# Patient Record
Sex: FEMALE | URBAN - NONMETROPOLITAN AREA
[De-identification: names, ages, dates, MRNs, and addresses within clinical notes are randomized per-mention and may not be internally consistent; named-entity substitution may affect disease eponyms.]

---

## 2018-05-30 ENCOUNTER — APPOINTMENT (OUTPATIENT)
Age: 68
Setting detail: DERMATOLOGY
End: 2018-05-30

## 2018-05-30 DIAGNOSIS — L63.8 OTHER ALOPECIA AREATA: ICD-10-CM

## 2018-05-30 DIAGNOSIS — L29.8 OTHER PRURITUS: ICD-10-CM

## 2018-05-30 DIAGNOSIS — D485 NEOPLASM OF UNCERTAIN BEHAVIOR OF SKIN: ICD-10-CM

## 2018-05-30 PROBLEM — D48.5 NEOPLASM OF UNCERTAIN BEHAVIOR OF SKIN: Status: ACTIVE | Noted: 2018-05-30

## 2018-05-30 PROCEDURE — OTHER COUNSELING: OTHER

## 2018-05-30 PROCEDURE — OTHER INTRALESIONAL KENALOG: OTHER

## 2018-05-30 PROCEDURE — 99202 OFFICE O/P NEW SF 15 MIN: CPT | Mod: 25

## 2018-05-30 PROCEDURE — 11101: CPT

## 2018-05-30 PROCEDURE — 11100: CPT

## 2018-05-30 PROCEDURE — OTHER OTHER: OTHER

## 2018-05-30 PROCEDURE — 11900 INJECT SKIN LESIONS </W 7: CPT

## 2018-05-30 PROCEDURE — OTHER FOLLOW UP FOR NEXT VISIT: OTHER

## 2018-05-30 PROCEDURE — OTHER BIOPSY BY SHAVE METHOD: OTHER

## 2018-05-30 ASSESSMENT — LOCATION ZONE DERM
LOCATION ZONE: SCALP
LOCATION ZONE: FACE

## 2018-05-30 ASSESSMENT — LOCATION SIMPLE DESCRIPTION DERM
LOCATION SIMPLE: RIGHT CHEEK
LOCATION SIMPLE: LEFT CHEEK
LOCATION SIMPLE: POSTERIOR SCALP

## 2018-05-30 ASSESSMENT — LOCATION DETAILED DESCRIPTION DERM
LOCATION DETAILED: LEFT SUPERIOR CENTRAL MALAR CHEEK
LOCATION DETAILED: RIGHT CENTRAL MALAR CHEEK
LOCATION DETAILED: LEFT SUPERIOR OCCIPITAL SCALP

## 2018-05-30 NOTE — PROCEDURE: OTHER
Note Text (......Xxx Chief Complaint.): This diagnosis correlates with the
Detail Level: Zone
Other (Free Text): Size of initial lesion 4 x 6

## 2018-05-30 NOTE — PROCEDURE: INTRALESIONAL KENALOG
Include Z78.9 (Other Specified Conditions Influencing Health Status) As An Associated Diagnosis?: No
Medical Necessity Clause: This procedure was medically necessary because the lesions that were treated were:
Kenalog Preparation: Kenalog
Treatment Number (Optional): 1
Concentration Of Kenalog Solution Injected (Mg/Ml): 2.5
Consent: The risks of atrophy were reviewed with the patient.
Detail Level: Detailed
Total Volume (Ccs): .5
X Size Of Lesion In Cm (Optional): 0

## 2018-05-30 NOTE — PROCEDURE: BIOPSY BY SHAVE METHOD
Wound Care: Bacitracin
Cryotherapy Text: The wound bed was treated with cryotherapy after the biopsy was performed.
Billing Type: Third-Party Bill
Bill For Surgical Tray: no
X Size Of Lesion In Cm: 0
Anesthesia Volume In Cc: 0.5
Was A Bandage Applied: Yes
Curettage Text: The wound bed was treated with curettage after the biopsy was performed.
Notification Instructions: Patient will be notified of biopsy results. However, patient instructed to call the office if not contacted within 2 weeks.
Post-Care Instructions: I reviewed with the patient in detail post-care instructions. Patient is to keep the biopsy site dry overnight, and then apply bacitracin twice daily until healed. Patient may apply hydrogen peroxide soaks to remove any crusting.
Electrodesiccation Text: The wound bed was treated with electrodesiccation after the biopsy was performed.
Dressing: bandage
Detail Level: Detailed
Hemostasis: Drysol
Electrodesiccation And Curettage Text: The wound bed was treated with electrodesiccation and curettage after the biopsy was performed.
Silver Nitrate Text: The wound bed was treated with silver nitrate after the biopsy was performed.
Type Of Destruction Used: Curettage
Consent: Written consent was obtained and risks were reviewed including but not limited to scarring, infection, bleeding, scabbing, incomplete removal, nerve damage and allergy to anesthesia.
Biopsy Type: H and E
Size Of Lesion In Cm: 0.3

## 2018-06-20 ENCOUNTER — APPOINTMENT (OUTPATIENT)
Age: 68
Setting detail: DERMATOLOGY
End: 2018-06-21

## 2018-06-20 DIAGNOSIS — L29.8 OTHER PRURITUS: ICD-10-CM

## 2018-06-20 DIAGNOSIS — L63.8 OTHER ALOPECIA AREATA: ICD-10-CM

## 2018-06-20 PROBLEM — C44.329 SQUAMOUS CELL CARCINOMA OF SKIN OF OTHER PARTS OF FACE: Status: ACTIVE | Noted: 2018-06-20

## 2018-06-20 PROBLEM — D04.39 CARCINOMA IN SITU OF SKIN OF OTHER PARTS OF FACE: Status: ACTIVE | Noted: 2018-06-20

## 2018-06-20 PROCEDURE — 77285 THER RAD SIMULAJ FIELD INTRM: CPT

## 2018-06-20 PROCEDURE — OTHER SUPERFICIAL RADIATION TREATMENT: OTHER

## 2018-06-20 PROCEDURE — 77334 RADIATION TREATMENT AID(S): CPT

## 2018-06-20 PROCEDURE — OTHER FOLLOW UP FOR NEXT VISIT: OTHER

## 2018-06-20 PROCEDURE — 11901 INJECT SKIN LESIONS >7: CPT

## 2018-06-20 PROCEDURE — 77300 RADIATION THERAPY DOSE PLAN: CPT

## 2018-06-20 PROCEDURE — OTHER INTRALESIONAL KENALOG: OTHER

## 2018-06-20 PROCEDURE — 77262 THER RADIOLOGY TX PLNG INTRM: CPT

## 2018-06-20 PROCEDURE — 99212 OFFICE O/P EST SF 10 MIN: CPT | Mod: 25

## 2018-06-20 PROCEDURE — OTHER TREATMENT REGIMEN: OTHER

## 2018-06-20 PROCEDURE — OTHER COUNSELING: OTHER

## 2018-06-20 ASSESSMENT — LOCATION ZONE DERM: LOCATION ZONE: SCALP

## 2018-06-20 ASSESSMENT — LOCATION DETAILED DESCRIPTION DERM
LOCATION DETAILED: RIGHT SUPERIOR PARIETAL SCALP
LOCATION DETAILED: LEFT SUPERIOR PARIETAL SCALP
LOCATION DETAILED: LEFT SUPERIOR OCCIPITAL SCALP
LOCATION DETAILED: POSTERIOR MID-PARIETAL SCALP

## 2018-06-20 ASSESSMENT — LOCATION SIMPLE DESCRIPTION DERM
LOCATION SIMPLE: LEFT OCCIPITAL SCALP
LOCATION SIMPLE: POSTERIOR SCALP
LOCATION SIMPLE: SCALP

## 2018-06-20 NOTE — PROCEDURE: SUPERFICIAL RADIATION TREATMENT
Render Text From Evaluation And Management Tab (Will Not Bill 76147): No
Detail Level: Detailed
Fractions / Week Rx 2: 3
Treatment Device Design After Initial Simulation Justification (Will Render If Bill For Treatment Devices = Yes): The patient is status post radiation simulation and is evaluated as to the use of additional devices for shielding and placement for radiation therapy.
Port Dimensions-X Axis In Cm: 1.5
Bill For Simulation And Treatment Device Design: Yes
Number Of Treatment Days: 1
Patient Positioning: Supine
Intro Statement (Will Not Render If Left Blank): The patient is undergoing superficial radiation therapy for skin cancer and presents for weekly evaluation and management.  Per protocol and as documented on the flow sheet, the patient was questioned as to subjective redness, pruritus, pain, drainage, fatigue, or any other symptoms.  Objectively, the radiation area was evaluated with regards to erythema, atrophy, scale, crusting, erosion, ulceration, edema, purpura, tenderness, warmth, drainage, and any other findings.  The plan was extensively reviewed including the dose, and dosing schedule.  The simulation and clinical setup was also reviewed as was the external and any internal shields and based on this review the appropriateness and sufficiency of treatment was determined.
Total Number Of Fractions Rx 3: 15
Time Dose Fractionation (Optional- Include Units If Applicable): 88
Field Size (Applicator): 2.5 cm
Total Number Of Fractions: 20
Functional Status: 1 (ambulatory, light activity)
Energy (Optional-Please Include Units): 50kV
Fractionation Number (Evaluation): 5
Daily Fractionated Dose (Optional- Include Units) Rx 2: cGy
Dose Per Fractionation In Cgy (Optional): 253.11
Assessment: Appropriate reaction
Custom Shielding Afterword Text Will Not Be Included With Simple Simulations (X X Y Cm............): port to correlate with the lesion size, including treatment margin. The custom lead shield is adequate to accommodate the appropriate applicator and provide adequate shielding around the treatment site. Additional shielding (as noted below) is used to protect sensitive, normal tissues.
Treatment Time In Min (Optional): 0.33
Custom Shielding Preamble Text Will Not Be Included With Simple Simulations (.......... X X Y Cm): A lead shield of 0.762 mm thickness is utilized to form a molded, custom shield with a
Field Size (Applicator): 2.0 cm
Shielding Size (Optional- Include Units) Rx 2: 2.0 x 2.0 cm
Fractionation Number: 0
Shielding Size (Optional- Include Units): 2.0 x 2.0cm
Simple Simulation Preamble Text Will Be Included With Simple Simulations (.......... Indications): Simple simulation was performed today for the following reasons:
Treatment Time / Fractionation (Optional- Include Units) Rx 2: min
Simple Simulation Afterword Text Will Be Included With Simple Simulations (Indications............): The patient had a complete consultation regarding all applicable modalities for the treatment of their skin cancer and based on a variety of factors including the type of tumor, size, and location, the relevant medical history as well as local tissue factors, the functional status of the individual, the ability to perform necessary postoperative wound instructions and the need for simultaneous treatments as well as overall wound healing status, it was determined that the patient would begin radiation therapy treatment for skin cancer.  A full simulation and treatment device design was performed including the determination and formulation of appropriate simple and complex devices including lead shield of 0.762 mm thickness to form molded customized shielding to specifically correlate with the lesion size including treatment margin.  The custom lead shield is adequate to accommodate the appropriate applicator and provide adequate shielding around the treatment site.  The specific field applicator, shields, and devices both simple and complex as well as the specific patient setup is outlined below.  The patient was given a full consent for superficial radiation to both verbally and in writing and the full determination of patient's eligibility for treatment and selection is outlined on the patient eligibility and treatment selection form.  The specific superficial radiotherapy prescription was determined and was documented on the superficial radiotherapy prescription form.  A treatment calculation was also performed and documented on the treatment calculation form.  Based on the prescription, the patient was scheduled for a series of fractional treatments.
Dimensions-X Axis In Cm: 0.7
Port Dimensions-Y Axis In Cm: 2
Computed Treatment Time In Min (Will Render The Same As Calculated Treatment Time If Left Blank): 0.34
Shielding Size (Optional- Include Units): 1.5 x 1.5cm
Total Dose (Optional-Please Include Units): 5127.2cGy
Pathology Override (Pathology Will Render As Diagnosis Name If Left Blank): SCC, superficial
Treatment Margins In Cm: 0.5
Daily Fractionated Dose (Optional- Include Units): 256.36 cGy
Total Dose (Optional-Please Include Units): 5062.2 cGy
Daily Fractionated Dose (Optional- Include Units): 253.11 cGy
Treatment Margins In Cm: 0.8
Time Dose Fractionation (Optional- Include Units If Applicable): 86
Treatment Time / Fractionation (Optional- Include Units): 0.33 min
Treatment Time / Fractionation (Optional- Include Units): 0.34 min
Dose Per Fractionation In Cgy (Optional): 256.36

## 2018-06-20 NOTE — PROCEDURE: TREATMENT REGIMEN
Detail Level: Zone
Plan: Per the request of Dr. Sanchez, patient was seen today for Superficial Radiation Therapy requiring simulation (CPT® 99250) in preparation for treatment of specific diseased site(s). Simulation is necessary to determine correct patient and treatment portal positioning, deliver safe and effective radiation therapy. Today’s setup was evaluated determining continuation of treatment with the current plan, or necessary changes as appropriate. All appropriate custom blocking and treatment parameters verified by the Radiation Therapist\\n\\nToday’s visit is for Simulation and planning for radiation therapy.  Questions were answered and concerns were addressed.  Patient was evaluated based on listed criteria and is a suitable candidate to begin SRT.

## 2018-06-20 NOTE — PROCEDURE: INTRALESIONAL KENALOG
Administered By (Optional): DR. CAR
Include Z78.9 (Other Specified Conditions Influencing Health Status) As An Associated Diagnosis?: No
Medical Necessity Clause: This procedure was medically necessary because the lesions that were treated were:
Total Volume (Ccs): 1
Expiration Date For Kenalog (Optional): 8/8
Concentration Of Kenalog Solution Injected (Mg/Ml): 4.0
Treatment Number (Optional): 2
Ndc# For Kenalog Only: 7971-1636-01
Kenalog Preparation: Kenalog
Detail Level: Detailed
Consent: The risks of atrophy were reviewed with the patient.
Lot # For Kenalog (Optional): [id number]
X Size Of Lesion In Cm (Optional): 0

## 2018-06-25 ENCOUNTER — APPOINTMENT (OUTPATIENT)
Age: 68
Setting detail: DERMATOLOGY
End: 2018-06-26

## 2018-06-25 PROBLEM — D04.39 CARCINOMA IN SITU OF SKIN OF OTHER PARTS OF FACE: Status: ACTIVE | Noted: 2018-06-25

## 2018-06-25 PROBLEM — C44.329 SQUAMOUS CELL CARCINOMA OF SKIN OF OTHER PARTS OF FACE: Status: ACTIVE | Noted: 2018-06-25

## 2018-06-25 PROCEDURE — OTHER TREATMENT REGIMEN: OTHER

## 2018-06-25 PROCEDURE — 77280 THER RAD SIMULAJ FIELD SMPL: CPT

## 2018-06-25 PROCEDURE — OTHER SUPERFICIAL RADIATION TREATMENT: OTHER

## 2018-06-25 PROCEDURE — OTHER FOLLOW UP FOR NEXT VISIT: OTHER

## 2018-06-25 PROCEDURE — G6001 ECHO GUIDANCE RADIOTHERAPY: HCPCS

## 2018-06-25 PROCEDURE — 77401 RADIATION TX DELIVERY SUPFC: CPT

## 2018-06-25 NOTE — PROCEDURE: TREATMENT REGIMEN
Detail Level: Zone
Plan: Per the request of Dr. Sanchez, patient was seen today for Superficial Radiation Therapy requiring simulation (CPT® 07671) in preparation for treatment of specific diseased site(s). Simulation is necessary to determine correct patient and treatment portal positioning, deliver safe and effective radiation therapy. A high frequency ultrasound image was acquired prior to treatment today for three dimensional evaluation of tumor volume and response to treatment, in addition, geometric accuracy of field placement (CPT®  ). Physician evaluation of the ultrasound tumor depth will be ongoing through course of treatment, and is deemed medically necessary ensuring efficacy of treatment. Today’s image and setup was evaluated determining continuation of treatment with the current plan, or necessary changes as appropriate. All appropriate custom blocking and treatment parameters verified by the radiation therapist according to initial simulation. \\n\\nPer Dr. Sanchez, continued daily US guidance and simulation is required for field placement, measurement of tumor depth, progress and edema monitoring.\\n\\nEvaluation prior to treatment for response and reaction to SRT based on current fraction and cumulative dose with a visual inspection and ultrasound demonstrates a normal expected response.  RTOG Acute Radiation Morbidity Score = 0.  Superficial Radiation Therapy will continue as planned.\\n\\nUS image guidance and field placement prior to treatment delivery performed. \\n\\nRt Central Malar Cheek\\nUS Depth 0.91mm, Repop +++, [place] equivocal\\n\\nLt Superior Central Malar Cheek\\nUS depth is 0.96mm, Repop ++, [place] equivocal

## 2018-06-25 NOTE — PROCEDURE: SUPERFICIAL RADIATION TREATMENT
Render Prescriptions In Note?: No
Port Dimensions-Y Axis In Cm: 1.5
Time Dose Fractionation (Optional- Include Units If Applicable): 88
Fractions / Week Rx 4: 5
Ssd In Cm (Optional): 15
Number Of Treatment Days: 1
Treatment Time / Fractionation (Optional- Include Units): 0.34 min
Assessment: Appropriate reaction
Treatment Time / Fractionation (Optional- Include Units): 0.33 min
Field Size (Applicator): 2.0 cm
Custom Shielding Preamble Text Will Not Be Included With Simple Simulations (.......... X X Y Cm): A lead shield of 0.762 mm thickness is utilized to form a molded, custom shield with a
Port Dimensions-Y Axis In Cm: 2
Cumulative Dose In Cgy (Optional): 256.36
Daily Fractionated Dose (Optional- Include Units) Rx 2: cGy
Simple Simulation Afterword Text Will Be Included With Simple Simulations (Indications............): The patient had a complete consultation regarding all applicable modalities for the treatment of their skin cancer and based on a variety of factors including the type of tumor, size, and location, the relevant medical history as well as local tissue factors, the functional status of the individual, the ability to perform necessary postoperative wound instructions and the need for simultaneous treatments as well as overall wound healing status, it was determined that the patient would begin radiation therapy treatment for skin cancer.  A full simulation and treatment device design was performed including the determination and formulation of appropriate simple and complex devices including lead shield of 0.762 mm thickness to form molded customized shielding to specifically correlate with the lesion size including treatment margin.  The custom lead shield is adequate to accommodate the appropriate applicator and provide adequate shielding around the treatment site.  The specific field applicator, shields, and devices both simple and complex as well as the specific patient setup is outlined below.  The patient was given a full consent for superficial radiation to both verbally and in writing and the full determination of patient's eligibility for treatment and selection is outlined on the patient eligibility and treatment selection form.  The specific superficial radiotherapy prescription was determined and was documented on the superficial radiotherapy prescription form.  A treatment calculation was also performed and documented on the treatment calculation form.  Based on the prescription, the patient was scheduled for a series of fractional treatments.
Simple Simulation Preamble Text Will Be Included With Simple Simulations (.......... Indications): Simple simulation was performed today for the following reasons:
Detail Level: Detailed
Fractions / Week: 3
Treatment Margins In Cm: 0.8
Energy (Optional-Please Include Units): 50kV
Treatment Time / Fractionation (Optional- Include Units) Rx 2: min
Cumulative Dose In Cgy (Optional): 253.11
Shielding Size (Optional- Include Units) Rx 2: 2.0 x 2.0 cm
Intro Statement (Will Not Render If Left Blank): The patient is undergoing superficial radiation therapy for skin cancer and presents for weekly evaluation and management.  Per protocol and as documented on the flow sheet, the patient was questioned as to subjective redness, pruritus, pain, drainage, fatigue, or any other symptoms.  Objectively, the radiation area was evaluated with regards to erythema, atrophy, scale, crusting, erosion, ulceration, edema, purpura, tenderness, warmth, drainage, and any other findings.  The plan was extensively reviewed including the dose, and dosing schedule.  The simulation and clinical setup was also reviewed as was the external and any internal shields and based on this review the appropriateness and sufficiency of treatment was determined.
Bill For Radiation Treatment: Yes
Field Size (Applicator): 2.5 cm
Total Dose (Optional-Please Include Units): 5127.2cGy
Functional Status: 1 (ambulatory, light activity)
Daily Fractionated Dose (Optional- Include Units): 253.11 cGy
Dimensions-X Axis In Cm: 0.7
Treatment Device Design After Initial Simulation Justification (Will Render If Bill For Treatment Devices = Yes): The patient is status post radiation simulation and is evaluated as to the use of additional devices for shielding and placement for radiation therapy.
Custom Shielding Afterword Text Will Not Be Included With Simple Simulations (X X Y Cm............): port to correlate with the lesion size, including treatment margin. The custom lead shield is adequate to accommodate the appropriate applicator and provide adequate shielding around the treatment site. Additional shielding (as noted below) is used to protect sensitive, normal tissues.
Day Of The Week Treatment Administered: Monday
Total Dose (Optional-Please Include Units): 5062.2 cGy
Time Dose Fractionation (Optional- Include Units If Applicable): 86
Total Number Of Fractions: 20
Treatment Time In Min (Optional): 0.34
Patient Positioning: Supine
Shielding Size (Optional- Include Units): 2.0 x 2.0cm
Treatment Time In Min (Optional): 0.33
Treatment Margins In Cm: 0.5
Shielding Size (Optional- Include Units): 1.5 x 1.5cm
Daily Fractionated Dose (Optional- Include Units): 256.36 cGy
Pathology Override (Pathology Will Render As Diagnosis Name If Left Blank): SCC, superficial

## 2018-06-27 ENCOUNTER — APPOINTMENT (OUTPATIENT)
Age: 68
Setting detail: DERMATOLOGY
End: 2018-06-28

## 2018-06-27 PROBLEM — D04.39 CARCINOMA IN SITU OF SKIN OF OTHER PARTS OF FACE: Status: ACTIVE | Noted: 2018-06-27

## 2018-06-27 PROBLEM — C44.329 SQUAMOUS CELL CARCINOMA OF SKIN OF OTHER PARTS OF FACE: Status: ACTIVE | Noted: 2018-06-27

## 2018-06-27 PROCEDURE — OTHER SUPERFICIAL RADIATION TREATMENT: OTHER

## 2018-06-27 PROCEDURE — OTHER TREATMENT REGIMEN: OTHER

## 2018-06-27 PROCEDURE — 77280 THER RAD SIMULAJ FIELD SMPL: CPT

## 2018-06-27 PROCEDURE — 77401 RADIATION TX DELIVERY SUPFC: CPT

## 2018-06-27 PROCEDURE — OTHER FOLLOW UP FOR NEXT VISIT: OTHER

## 2018-06-27 PROCEDURE — G6001 ECHO GUIDANCE RADIOTHERAPY: HCPCS

## 2018-06-27 NOTE — PROCEDURE: TREATMENT REGIMEN
Detail Level: Zone
Plan: Per the request of Dr. Sanchez, patient was seen today for Superficial Radiation Therapy requiring simulation (CPT® 89444) in preparation for treatment of specific diseased site(s). Simulation is necessary to determine correct patient and treatment portal positioning, deliver safe and effective radiation therapy. A high frequency ultrasound image was acquired prior to treatment today for three dimensional evaluation of tumor volume and response to treatment, in addition, geometric accuracy of field placement (CPT®  ). Physician evaluation of the ultrasound tumor depth will be ongoing through course of treatment, and is deemed medically necessary ensuring efficacy of treatment. Today’s image and setup was evaluated determining continuation of treatment with the current plan, or necessary changes as appropriate. All appropriate custom blocking and treatment parameters verified by the radiation therapist according to initial simulation. \\n\\nPer Dr. Sanchez, continued daily US guidance and simulation is required for field placement, measurement of tumor depth, progress and edema monitoring.\\n\\nEvaluation prior to treatment for response and reaction to SRT based on current fraction and cumulative dose with a visual inspection and ultrasound demonstrates a normal expected response.  RTOG Acute Radiation Morbidity Score = 0.  Superficial Radiation Therapy will continue as planned.\\n\\nUS image guidance and field placement prior to treatment delivery performed. \\n\\nRt Central Malar Cheek\\nUS Depth 0.83mm, Repop +++, [place] equivocal due to high repopulation\\n\\nLt Superior Central Malar Cheek\\nUS depth is 0.76mm, Repop +++, [place] equivocal due to high repopulation

## 2018-06-27 NOTE — PROCEDURE: SUPERFICIAL RADIATION TREATMENT
Field Size (Applicator) Rx 2: 2.5 cm
Ssd In Cm (Optional): 15
Dimensions-X Axis In Cm: 1
Bill For Dosimetry/Render Treatment Time Calculation In Note: No
Patient Positioning: Supine
Treatment Time / Fractionation (Optional- Include Units): 0.33 min
Simple Simulation Preamble Text Will Be Included With Simple Simulations (.......... Indications): Simple simulation was performed today for the following reasons:
Energy (Optional-Please Include Units) Rx 2: 50Kv
Shielding Size (Optional- Include Units): 1.5 x 1.5cm
Fractions / Week Rx 3: 5
Pathology Override (Pathology Will Render As Diagnosis Name If Left Blank): SCC, superficial
Intro Statement (Will Not Render If Left Blank): The patient is undergoing superficial radiation therapy for skin cancer and presents for weekly evaluation and management.  Per protocol and as documented on the flow sheet, the patient was questioned as to subjective redness, pruritus, pain, drainage, fatigue, or any other symptoms.  Objectively, the radiation area was evaluated with regards to erythema, atrophy, scale, crusting, erosion, ulceration, edema, purpura, tenderness, warmth, drainage, and any other findings.  The plan was extensively reviewed including the dose, and dosing schedule.  The simulation and clinical setup was also reviewed as was the external and any internal shields and based on this review the appropriateness and sufficiency of treatment was determined.
Custom Shielding Afterword Text Will Not Be Included With Simple Simulations (X X Y Cm............): port to correlate with the lesion size, including treatment margin. The custom lead shield is adequate to accommodate the appropriate applicator and provide adequate shielding around the treatment site. Additional shielding (as noted below) is used to protect sensitive, normal tissues.
Dose / Tx In Cgy (Optional): 253.11
Cumulative Dose In Cgy (Optional): 506.22
Functional Status: 1 (ambulatory, light activity)
Day Of The Week Treatment Administered: Wednesday
Time Dose Fractionation (Optional- Include Units If Applicable): 86
Port Dimensions-X Axis In Cm: 1.5
Total Dose (Optional-Please Include Units) Rx 2: cGy
Port Dimensions-Y Axis In Cm: 2
Daily Fractionated Dose (Optional- Include Units): 253.11 cGy
Custom Shielding Preamble Text Will Not Be Included With Simple Simulations (.......... X X Y Cm): A lead shield of 0.762 mm thickness is utilized to form a molded, custom shield with a
Computed Treatment Time In Min (Will Render The Same As Calculated Treatment Time If Left Blank): 0.33
Assessment: Appropriate reaction
Treatment Time / Fractionation (Optional- Include Units) Rx 2: min
Simple Simulation Afterword Text Will Be Included With Simple Simulations (Indications............): The patient had a complete consultation regarding all applicable modalities for the treatment of their skin cancer and based on a variety of factors including the type of tumor, size, and location, the relevant medical history as well as local tissue factors, the functional status of the individual, the ability to perform necessary postoperative wound instructions and the need for simultaneous treatments as well as overall wound healing status, it was determined that the patient would begin radiation therapy treatment for skin cancer.  A full simulation and treatment device design was performed including the determination and formulation of appropriate simple and complex devices including lead shield of 0.762 mm thickness to form molded customized shielding to specifically correlate with the lesion size including treatment margin.  The custom lead shield is adequate to accommodate the appropriate applicator and provide adequate shielding around the treatment site.  The specific field applicator, shields, and devices both simple and complex as well as the specific patient setup is outlined below.  The patient was given a full consent for superficial radiation to both verbally and in writing and the full determination of patient's eligibility for treatment and selection is outlined on the patient eligibility and treatment selection form.  The specific superficial radiotherapy prescription was determined and was documented on the superficial radiotherapy prescription form.  A treatment calculation was also performed and documented on the treatment calculation form.  Based on the prescription, the patient was scheduled for a series of fractional treatments.
Bill For Radiation Treatment: Yes
Fractions / Week Rx 2: 3
Dose / Tx In Cgy (Optional): 256.36
Total Dose (Optional-Please Include Units): 5127.2cGy
Treatment Device Design After Initial Simulation Justification (Will Render If Bill For Treatment Devices = Yes): The patient is status post radiation simulation and is evaluated as to the use of additional devices for shielding and placement for radiation therapy.
Dimensions-Y Axis In Cm: 0.7
Field Size (Applicator): 2.0 cm
Total Number Of Fractions: 20
Treatment Time / Fractionation (Optional- Include Units): 0.34 min
Shielding Size (Optional- Include Units): 2.0 x 2.0cm
Treatment Margins In Cm: 0.5
Treatment Margins In Cm: 0.8
Daily Fractionated Dose (Optional- Include Units): 256.36 cGy
Detail Level: Detailed
Treatment Time In Min (Optional): 0.34
Time Dose Fractionation (Optional- Include Units If Applicable): 88
Cumulative Dose In Cgy (Optional): 512.72
Shielding Size (Optional- Include Units) Rx 2: 2.0 x 2.0 cm
Total Dose (Optional-Please Include Units): 5062.2 cGy

## 2018-06-28 ENCOUNTER — APPOINTMENT (OUTPATIENT)
Age: 68
Setting detail: DERMATOLOGY
End: 2018-06-28

## 2018-06-28 PROBLEM — D04.39 CARCINOMA IN SITU OF SKIN OF OTHER PARTS OF FACE: Status: ACTIVE | Noted: 2018-06-28

## 2018-06-28 PROBLEM — C44.329 SQUAMOUS CELL CARCINOMA OF SKIN OF OTHER PARTS OF FACE: Status: ACTIVE | Noted: 2018-06-28

## 2018-06-28 PROCEDURE — 77401 RADIATION TX DELIVERY SUPFC: CPT

## 2018-06-28 PROCEDURE — OTHER TREATMENT REGIMEN: OTHER

## 2018-06-28 PROCEDURE — 77280 THER RAD SIMULAJ FIELD SMPL: CPT

## 2018-06-28 PROCEDURE — G6001 ECHO GUIDANCE RADIOTHERAPY: HCPCS

## 2018-06-28 PROCEDURE — OTHER FOLLOW UP FOR NEXT VISIT: OTHER

## 2018-06-28 PROCEDURE — OTHER SUPERFICIAL RADIATION TREATMENT: OTHER

## 2018-06-28 NOTE — PROCEDURE: TREATMENT REGIMEN
Plan: Per the request of Dr. Sanchez, patient was seen today for Superficial Radiation Therapy requiring simulation (CPT® 26960) in preparation for treatment of specific diseased site(s). Simulation is necessary to determine correct patient and treatment portal positioning, deliver safe and effective radiation therapy. A high frequency ultrasound image was acquired prior to treatment today for three dimensional evaluation of tumor volume and response to treatment, in addition, geometric accuracy of field placement (CPT®  ). Physician evaluation of the ultrasound tumor depth will be ongoing through course of treatment, and is deemed medically necessary ensuring efficacy of treatment. Today’s image and setup was evaluated determining continuation of treatment with the current plan, or necessary changes as appropriate. All appropriate custom blocking and treatment parameters verified by the radiation therapist according to initial simulation. \\n\\nPer Dr. Sanchez, continued daily US guidance and simulation is required for field placement, measurement of tumor depth, progress and edema monitoring.\\n\\nEvaluation prior to treatment for response and reaction to SRT based on current fraction and cumulative dose with a visual inspection and ultrasound demonstrates a normal expected response.  RTOG Acute Radiation Morbidity Score = 0.  Superficial Radiation Therapy will continue as planned.\\n\\nUS image guidance and field placement prior to treatment delivery performed. \\n\\nRt Central Malar Cheek\\nUS Depth 0.83mm, Repop +++, [place] equivocal due to high repopulation\\n\\nLt Superior Central Malar Cheek\\nUS depth is 1.03mm, Repop +++, [place] equivocal due to high repopulation
Detail Level: Zone

## 2018-07-02 ENCOUNTER — APPOINTMENT (OUTPATIENT)
Age: 68
Setting detail: DERMATOLOGY
End: 2018-07-03

## 2018-07-02 PROBLEM — C44.329 SQUAMOUS CELL CARCINOMA OF SKIN OF OTHER PARTS OF FACE: Status: ACTIVE | Noted: 2018-07-02

## 2018-07-02 PROBLEM — D04.39 CARCINOMA IN SITU OF SKIN OF OTHER PARTS OF FACE: Status: ACTIVE | Noted: 2018-07-02

## 2018-07-02 PROCEDURE — OTHER FOLLOW UP FOR NEXT VISIT: OTHER

## 2018-07-02 PROCEDURE — OTHER SUPERFICIAL RADIATION TREATMENT: OTHER

## 2018-07-02 PROCEDURE — 77280 THER RAD SIMULAJ FIELD SMPL: CPT

## 2018-07-02 PROCEDURE — OTHER TREATMENT REGIMEN: OTHER

## 2018-07-02 PROCEDURE — G6001 ECHO GUIDANCE RADIOTHERAPY: HCPCS

## 2018-07-02 PROCEDURE — 77401 RADIATION TX DELIVERY SUPFC: CPT

## 2018-07-02 NOTE — PROCEDURE: TREATMENT REGIMEN
Plan: Per the request of Dr. Sanchez, patient was seen today for Superficial Radiation Therapy requiring simulation (CPT® 35735) in preparation for treatment of specific diseased site(s). Simulation is necessary to determine correct patient and treatment portal positioning, deliver safe and effective radiation therapy. A high frequency ultrasound image was acquired prior to treatment today for three dimensional evaluation of tumor volume and response to treatment, in addition, geometric accuracy of field placement (CPT®  ). Physician evaluation of the ultrasound tumor depth will be ongoing through course of treatment, and is deemed medically necessary ensuring efficacy of treatment. Today’s image and setup was evaluated determining continuation of treatment with the current plan, or necessary changes as appropriate. All appropriate custom blocking and treatment parameters verified by the radiation therapist according to initial simulation. \\n\\nPer Dr. Sanchez, continued daily US guidance and simulation is required for field placement, measurement of tumor depth, progress and edema monitoring.\\n\\nEvaluation prior to treatment for response and reaction to SRT based on current fraction and cumulative dose with a visual inspection and ultrasound demonstrates a normal expected response.  RTOG Acute Radiation Morbidity Score = 0.  Superficial Radiation Therapy will continue as planned.\\n\\nUS image guidance and field placement prior to treatment delivery performed. \\n\\nRt Central Malar Cheek\\nUS Depth 0.0mm, Repop +++, [place] 0.0mm sq\\n\\nLt Superior Central Malar Cheek\\nUS depth is 0.0mm, Repop +++, [place] 0.0mm sq
Detail Level: Zone

## 2018-07-02 NOTE — PROCEDURE: SUPERFICIAL RADIATION TREATMENT
Pathology Override (Pathology Will Render As Diagnosis Name If Left Blank): SCC, superficial
Port Dimensions-X Axis In Cm: 1.5
Simple Simulation Preamble Text Will Be Included With Simple Simulations (.......... Indications): Simple simulation was performed today for the following reasons:
Energy (Optional-Please Include Units) Rx 2: 50Kv
Detail Level: Detailed
Treatment Device Design After Initial Simulation Justification (Will Render If Bill For Treatment Devices = Yes): The patient is status post radiation simulation and is evaluated as to the use of additional devices for shielding and placement for radiation therapy.
Total Number Of Fractions Rx 2: 15
Simple Simulation Afterword Text Will Be Included With Simple Simulations (Indications............): The patient had a complete consultation regarding all applicable modalities for the treatment of their skin cancer and based on a variety of factors including the type of tumor, size, and location, the relevant medical history as well as local tissue factors, the functional status of the individual, the ability to perform necessary postoperative wound instructions and the need for simultaneous treatments as well as overall wound healing status, it was determined that the patient would begin radiation therapy treatment for skin cancer.  A full simulation and treatment device design was performed including the determination and formulation of appropriate simple and complex devices including lead shield of 0.762 mm thickness to form molded customized shielding to specifically correlate with the lesion size including treatment margin.  The custom lead shield is adequate to accommodate the appropriate applicator and provide adequate shielding around the treatment site.  The specific field applicator, shields, and devices both simple and complex as well as the specific patient setup is outlined below.  The patient was given a full consent for superficial radiation to both verbally and in writing and the full determination of patient's eligibility for treatment and selection is outlined on the patient eligibility and treatment selection form.  The specific superficial radiotherapy prescription was determined and was documented on the superficial radiotherapy prescription form.  A treatment calculation was also performed and documented on the treatment calculation form.  Based on the prescription, the patient was scheduled for a series of fractional treatments.
Custom Shielding Afterword Text Will Not Be Included With Simple Simulations (X X Y Cm............): port to correlate with the lesion size, including treatment margin. The custom lead shield is adequate to accommodate the appropriate applicator and provide adequate shielding around the treatment site. Additional shielding (as noted below) is used to protect sensitive, normal tissues.
Shielding Size (Optional- Include Units): 2.0 x 2.0cm
Treatment Time / Fractionation (Optional- Include Units) Rx 2: min
Field Size (Applicator): 2.0 cm
Bill For Radiation Treatment: Yes
Fractions / Week Rx 4: 5
Render Text From Evaluation And Management Tab (Will Not Bill [id number]): No
Number Of Days Off Treatment: 1
Port Dimensions-Y Axis In Cm: 2
Shielding Size (Optional- Include Units): 1.5 x 1.5cm
Field Size (Applicator) Rx 2: 2.5 cm
Custom Shielding Preamble Text Will Not Be Included With Simple Simulations (.......... X X Y Cm): A lead shield of 0.762 mm thickness is utilized to form a molded, custom shield with a
Assessment: Appropriate reaction
Intro Statement (Will Not Render If Left Blank): The patient is undergoing superficial radiation therapy for skin cancer and presents for weekly evaluation and management.  Per protocol and as documented on the flow sheet, the patient was questioned as to subjective redness, pruritus, pain, drainage, fatigue, or any other symptoms.  Objectively, the radiation area was evaluated with regards to erythema, atrophy, scale, crusting, erosion, ulceration, edema, purpura, tenderness, warmth, drainage, and any other findings.  The plan was extensively reviewed including the dose, and dosing schedule.  The simulation and clinical setup was also reviewed as was the external and any internal shields and based on this review the appropriateness and sufficiency of treatment was determined.
Dimensions-X Axis In Cm: 0.7
Total Dose (Optional-Please Include Units): 5062.2 cGy
Day Of The Week Treatment Administered: Monday
Computed Treatment Time In Min (Will Render The Same As Calculated Treatment Time If Left Blank): 0.33
Treatment Margins In Cm: 0.8
Treatment Time / Fractionation (Optional- Include Units): 0.34 min
Fractionation Number: 4
Treatment Margins In Cm: 0.5
Daily Fractionated Dose (Optional- Include Units): 253.11 cGy
Fractions / Week: 3
Time Dose Fractionation (Optional- Include Units If Applicable): 88
Time Dose Fractionation (Optional- Include Units If Applicable): 86
Dose Per Fractionation In Cgy (Optional): 253.11
Daily Fractionated Dose (Optional- Include Units) Rx 2: cGy
Daily Fractionated Dose (Optional- Include Units): 256.36 cGy
Total Dose (Optional-Please Include Units): 5127.2cGy
Shielding Size (Optional- Include Units) Rx 2: 2.0 x 2.0 cm
Patient Positioning: Supine
Computed Treatment Time In Min (Will Render The Same As Calculated Treatment Time If Left Blank): 0.34
Functional Status: 1 (ambulatory, light activity)
Dose Per Fractionation In Cgy (Optional): 256.36
Total Number Of Fractions: 20
Treatment Time / Fractionation (Optional- Include Units): 0.33 min
Cumulative Dose In Cgy (Optional): 1012.44
Cumulative Dose In Cgy (Optional): 1025.44

## 2018-07-03 ENCOUNTER — APPOINTMENT (OUTPATIENT)
Age: 68
Setting detail: DERMATOLOGY
End: 2018-07-05

## 2018-07-03 PROBLEM — D04.39 CARCINOMA IN SITU OF SKIN OF OTHER PARTS OF FACE: Status: ACTIVE | Noted: 2018-07-03

## 2018-07-03 PROBLEM — C44.329 SQUAMOUS CELL CARCINOMA OF SKIN OF OTHER PARTS OF FACE: Status: ACTIVE | Noted: 2018-07-03

## 2018-07-03 PROCEDURE — OTHER SUPERFICIAL RADIATION TREATMENT: OTHER

## 2018-07-03 PROCEDURE — 77427 RADIATION TX MANAGEMENT X5: CPT | Mod: 25

## 2018-07-03 PROCEDURE — G6001 ECHO GUIDANCE RADIOTHERAPY: HCPCS

## 2018-07-03 PROCEDURE — 77280 THER RAD SIMULAJ FIELD SMPL: CPT

## 2018-07-03 PROCEDURE — OTHER FOLLOW UP FOR NEXT VISIT: OTHER

## 2018-07-03 PROCEDURE — OTHER TREATMENT REGIMEN: OTHER

## 2018-07-03 PROCEDURE — 77401 RADIATION TX DELIVERY SUPFC: CPT

## 2018-07-03 NOTE — PROCEDURE: SUPERFICIAL RADIATION TREATMENT
Include Rx 4 When Rendering Additional Prescriptions: No
Treatment Time / Fractionation (Optional- Include Units) Rx 2: min
Prescription Used: 2
Treatment Time In Min (Optional): 0.34
Fractions / Week Rx 4: 5
Bill For Radiation Treatment: Yes
Simple Simulation Preamble Text Will Be Included With Simple Simulations (.......... Indications): Simple simulation was performed today for the following reasons:
Simple Simulation Afterword Text Will Be Included With Simple Simulations (Indications............): The patient had a complete consultation regarding all applicable modalities for the treatment of their skin cancer and based on a variety of factors including the type of tumor, size, and location, the relevant medical history as well as local tissue factors, the functional status of the individual, the ability to perform necessary postoperative wound instructions and the need for simultaneous treatments as well as overall wound healing status, it was determined that the patient would begin radiation therapy treatment for skin cancer.  A full simulation and treatment device design was performed including the determination and formulation of appropriate simple and complex devices including lead shield of 0.762 mm thickness to form molded customized shielding to specifically correlate with the lesion size including treatment margin.  The custom lead shield is adequate to accommodate the appropriate applicator and provide adequate shielding around the treatment site.  The specific field applicator, shields, and devices both simple and complex as well as the specific patient setup is outlined below.  The patient was given a full consent for superficial radiation to both verbally and in writing and the full determination of patient's eligibility for treatment and selection is outlined on the patient eligibility and treatment selection form.  The specific superficial radiotherapy prescription was determined and was documented on the superficial radiotherapy prescription form.  A treatment calculation was also performed and documented on the treatment calculation form.  Based on the prescription, the patient was scheduled for a series of fractional treatments.
Port Dimensions-Y Axis In Cm: 1.5
Energy (Include Units): 50kV
Custom Shielding Afterword Text Will Not Be Included With Simple Simulations (X X Y Cm............): port to correlate with the lesion size, including treatment margin. The custom lead shield is adequate to accommodate the appropriate applicator and provide adequate shielding around the treatment site. Additional shielding (as noted below) is used to protect sensitive, normal tissues.
Dose / Tx In Cgy (Optional): 253.11
Time Dose Fractionation (Optional- Include Units If Applicable): 88
Dose Per Fractionation In Cgy (Optional): 256.36
Treatment Margins In Cm: 0.8
Fractions / Week: 3
Total Number Of Fractions Rx 3: 15
Field Size (Applicator): 2.0 cm
Intro Statement (Will Not Render If Left Blank): The patient is undergoing superficial radiation therapy for skin cancer and presents for weekly evaluation and management.  Per protocol and as documented on the flow sheet, the patient was questioned as to subjective redness, pruritus, pain, drainage, fatigue, or any other symptoms.  Objectively, the radiation area was evaluated with regards to erythema, atrophy, scale, crusting, erosion, ulceration, edema, purpura, tenderness, warmth, drainage, and any other findings.  The plan was extensively reviewed including the dose, and dosing schedule.  The simulation and clinical setup was also reviewed as was the external and any internal shields and based on this review the appropriateness and sufficiency of treatment was determined.
Dimensions-X Axis In Cm: 1
Cumulative Dose In Cgy (Optional): 1265.55
Day Of The Week Treatment Administered: Tuesday
Comments: On [employer], RTOG 0
Field Size (Applicator): 2.5 cm
Total Number Of Fractions: 20
Functional Status: 1 (ambulatory, light activity)
Treatment Margins In Cm: 0.5
Pathology Override (Pathology Will Render As Diagnosis Name If Left Blank): SCC, superficial
Assessment: Appropriate reaction
Patient Positioning: Supine
Total Dose (Optional-Please Include Units) Rx 2: cGy
Custom Shielding Preamble Text Will Not Be Included With Simple Simulations (.......... X X Y Cm): A lead shield of 0.762 mm thickness is utilized to form a molded, custom shield with a
Daily Fractionated Dose (Optional- Include Units): 256.36 cGy
Treatment Time / Fractionation (Optional- Include Units): 0.33 min
Detail Level: Detailed
Dimensions-X Axis In Cm: 0.7
Computed Treatment Time In Min (Will Render The Same As Calculated Treatment Time If Left Blank): 0.33
Shielding Size (Optional- Include Units): 1.5 x 1.5cm
Time Dose Fractionation (Optional- Include Units If Applicable): 86
Treatment Time / Fractionation (Optional- Include Units): 0.34 min
Shielding Size (Optional- Include Units) Rx 2: 2.0 x 2.0 cm
Treatment Device Design After Initial Simulation Justification (Will Render If Bill For Treatment Devices = Yes): The patient is status post radiation simulation and is evaluated as to the use of additional devices for shielding and placement for radiation therapy.
Total Dose (Optional-Please Include Units): 5127.2cGy
Cumulative Dose In Cgy (Optional): 1281.8
Total Dose (Optional-Please Include Units): 5062.2 cGy
Daily Fractionated Dose (Optional- Include Units): 253.11 cGy
Shielding Size (Optional- Include Units): 2.0 x 2.0cm

## 2018-07-03 NOTE — PROCEDURE: TREATMENT REGIMEN
Detail Level: Zone
Plan: Per the request of Dr. Sanchez, patient was seen today for Superficial Radiation Therapy requiring simulation (CPT® 55841) in preparation for treatment of specific diseased site(s). Simulation is necessary to determine correct patient and treatment portal positioning, deliver safe and effective radiation therapy. A high frequency ultrasound image was acquired prior to treatment today for three dimensional evaluation of tumor volume and response to treatment, in addition, geometric accuracy of field placement (CPT®  ). Physician evaluation of the ultrasound tumor depth will be ongoing through course of treatment, and is deemed medically necessary ensuring efficacy of treatment. Today’s image and setup was evaluated determining continuation of treatment with the current plan, or necessary changes as appropriate. All appropriate custom blocking and treatment parameters verified by the radiation therapist according to initial simulation. \\n\\nPer Dr. Sanchez, continued daily US guidance and simulation is required for field placement, measurement of tumor depth, progress and edema monitoring.\\n\\nEvaluation prior to treatment for response and reaction to SRT based on current fraction and cumulative dose with a visual inspection and ultrasound demonstrates a normal expected response.  RTOG Acute Radiation Morbidity Score = 0.  Superficial Radiation Therapy will continue as planned.\\n\\nUS image guidance and field placement prior to treatment delivery performed. \\n\\nRt Central Malar Cheek\\nUS Depth 0.8mm, Repop +++, [place] equivocal\\n\\nLt Superior Central Malar Cheek\\nUS depth is 1.08mm, Repop +++, [place] equivocal

## 2018-07-05 ENCOUNTER — APPOINTMENT (OUTPATIENT)
Age: 68
Setting detail: DERMATOLOGY
End: 2018-07-05

## 2018-07-05 PROBLEM — C44.329 SQUAMOUS CELL CARCINOMA OF SKIN OF OTHER PARTS OF FACE: Status: ACTIVE | Noted: 2018-07-05

## 2018-07-05 PROBLEM — D04.39 CARCINOMA IN SITU OF SKIN OF OTHER PARTS OF FACE: Status: ACTIVE | Noted: 2018-07-05

## 2018-07-05 PROCEDURE — G6001 ECHO GUIDANCE RADIOTHERAPY: HCPCS

## 2018-07-05 PROCEDURE — OTHER FOLLOW UP FOR NEXT VISIT: OTHER

## 2018-07-05 PROCEDURE — 77280 THER RAD SIMULAJ FIELD SMPL: CPT

## 2018-07-05 PROCEDURE — OTHER TREATMENT REGIMEN: OTHER

## 2018-07-05 PROCEDURE — 77401 RADIATION TX DELIVERY SUPFC: CPT

## 2018-07-05 PROCEDURE — OTHER SUPERFICIAL RADIATION TREATMENT: OTHER

## 2018-07-05 NOTE — PROCEDURE: TREATMENT REGIMEN
Plan: Per the request of Dr. Sanchez, patient was seen today for Superficial Radiation Therapy requiring simulation (CPT® 60516) in preparation for treatment of specific diseased site(s). Simulation is necessary to determine correct patient and treatment portal positioning, deliver safe and effective radiation therapy. A high frequency ultrasound image was acquired prior to treatment today for three dimensional evaluation of tumor volume and response to treatment, in addition, geometric accuracy of field placement (CPT®  ). Physician evaluation of the ultrasound tumor depth will be ongoing through course of treatment, and is deemed medically necessary ensuring efficacy of treatment. Today’s image and setup was evaluated determining continuation of treatment with the current plan, or necessary changes as appropriate. All appropriate custom blocking and treatment parameters verified by the radiation therapist according to initial simulation. \\n\\nPer Dr. Sanchez, continued daily US guidance and simulation is required for field placement, measurement of tumor depth, progress and edema monitoring.\\n\\nEvaluation prior to treatment for response and reaction to SRT based on current fraction and cumulative dose with a visual inspection and ultrasound demonstrates a normal expected response.  RTOG Acute Radiation Morbidity Score = 0.  Superficial Radiation Therapy will continue as planned.\\n\\nUS image guidance and field placement prior to treatment delivery performed. \\n\\nRt Central Malar Cheek\\nUS Depth 0.0mm, Repop +++, [place] 0.0mm sq\\n\\nLt Superior Central Malar Cheek\\nUS depth is 0.0mm, Repop +++, [place] 0.0mm sq
Detail Level: Zone

## 2018-07-05 NOTE — PROCEDURE: SUPERFICIAL RADIATION TREATMENT
Bill For Treatment Devices Only: No
Assessment: Appropriate reaction
Field Number: 1
Fractions / Week: 3
Cumulative Dose In Cgy (Optional): 1518.66
Field Number: 2
Time Dose Fractionation (Optional- Include Units If Applicable): 88
Fractionation Number: 6
Field Size (Applicator): 2.0 cm
Port Dimensions-Y Axis In Cm: 1.5
Field Size (Applicator) Rx 2: 2.5 cm
Dose / Tx In Cgy (Optional): 256.36
Energy (Include Units): 50kV
Cumulative Dose In Cgy (Optional): 1536.16
Computed Treatment Time In Min (Will Render The Same As Calculated Treatment Time If Left Blank): 0.34
Functional Status: 1 (ambulatory, light activity)
Dose Per Fractionation In Cgy (Optional): 253.11
Custom Shielding Afterword Text Will Not Be Included With Simple Simulations (X X Y Cm............): port to correlate with the lesion size, including treatment margin. The custom lead shield is adequate to accommodate the appropriate applicator and provide adequate shielding around the treatment site. Additional shielding (as noted below) is used to protect sensitive, normal tissues.
Total Number Of Fractions Rx 4: 15
Dimensions-X Axis In Cm: 0.7
Fractions / Week Rx 3: 5
Day Of The Week Treatment Administered: Thursday
Treatment Margins In Cm: 0.8
Detail Level: Detailed
Treatment Margins In Cm: 0.5
Daily Fractionated Dose (Optional- Include Units): 256.36 cGy
Comments: On [employer], RTOG 0
Custom Shielding Preamble Text Will Not Be Included With Simple Simulations (.......... X X Y Cm): A lead shield of 0.762 mm thickness is utilized to form a molded, custom shield with a
Patient Positioning: Supine
Shielding Size (Optional- Include Units) Rx 2: 2.0 x 2.0 cm
Daily Fractionated Dose (Optional- Include Units): 253.11 cGy
Total Dose (Optional-Please Include Units) Rx 2: cGy
Treatment Time / Fractionation (Optional- Include Units): 0.34 min
Simple Simulation Preamble Text Will Be Included With Simple Simulations (.......... Indications): Simple simulation was performed today for the following reasons:
Total Dose (Optional-Please Include Units): 5062.2 cGy
Treatment Time / Fractionation (Optional- Include Units) Rx 2: min
Bill For Radiation Treatment: Yes
Initial Radiation Treatment Planning (Will Render If Bill Simulation = Yes): The patient had a complete consultation regarding all applicable modalities for the treatment of their skin cancer and based on a variety of factors including the type of tumor, size, and location, the relevant medical history as well as local tissue factors, the functional status of the individual, the ability to perform necessary postoperative wound instructions and the need for simultaneous treatments as well as overall wound healing status, it was determined that the patient would begin radiation therapy treatment for skin cancer.  A full simulation and treatment device design was performed including the determination and formulation of appropriate simple and complex devices including lead shield of 0.762 mm thickness to form molded customized shielding to specifically correlate with the lesion size including treatment margin.  The custom lead shield is adequate to accommodate the appropriate applicator and provide adequate shielding around the treatment site.  The specific field applicator, shields, and devices both simple and complex as well as the specific patient setup is outlined below.  The patient was given a full consent for superficial radiation to both verbally and in writing and the full determination of patient's eligibility for treatment and selection is outlined on the patient eligibility and treatment selection form.  The specific superficial radiotherapy prescription was determined and was documented on the superficial radiotherapy prescription form.  A treatment calculation was also performed and documented on the treatment calculation form.  Based on the prescription, the patient was scheduled for a series of fractional treatments.
Time Dose Fractionation (Optional- Include Units If Applicable): 86
Treatment Time / Fractionation (Optional- Include Units): 0.33 min
Intro Statement (Will Not Render If Left Blank): The patient is undergoing superficial radiation therapy for skin cancer and presents for weekly evaluation and management.  Per protocol and as documented on the flow sheet, the patient was questioned as to subjective redness, pruritus, pain, drainage, fatigue, or any other symptoms.  Objectively, the radiation area was evaluated with regards to erythema, atrophy, scale, crusting, erosion, ulceration, edema, purpura, tenderness, warmth, drainage, and any other findings.  The plan was extensively reviewed including the dose, and dosing schedule.  The simulation and clinical setup was also reviewed as was the external and any internal shields and based on this review the appropriateness and sufficiency of treatment was determined.
Total Number Of Fractions: 20
Pathology Override (Pathology Will Render As Diagnosis Name If Left Blank): SCC, superficial
Treatment Device Design After Initial Simulation Justification (Will Render If Bill For Treatment Devices = Yes): The patient is status post radiation simulation and is evaluated as to the use of additional devices for shielding and placement for radiation therapy.
Total Dose (Optional-Please Include Units): 5127.2cGy
Treatment Time In Min (Optional): 0.33
Shielding Size (Optional- Include Units): 1.5 x 1.5cm
Shielding Size (Optional- Include Units): 2.0 x 2.0cm

## 2018-07-09 ENCOUNTER — APPOINTMENT (OUTPATIENT)
Age: 68
Setting detail: DERMATOLOGY
End: 2018-07-09

## 2018-07-09 PROBLEM — C44.329 SQUAMOUS CELL CARCINOMA OF SKIN OF OTHER PARTS OF FACE: Status: ACTIVE | Noted: 2018-07-09

## 2018-07-09 PROBLEM — D04.39 CARCINOMA IN SITU OF SKIN OF OTHER PARTS OF FACE: Status: ACTIVE | Noted: 2018-07-09

## 2018-07-09 PROCEDURE — OTHER TREATMENT REGIMEN: OTHER

## 2018-07-09 PROCEDURE — OTHER SUPERFICIAL RADIATION TREATMENT: OTHER

## 2018-07-09 PROCEDURE — G6001 ECHO GUIDANCE RADIOTHERAPY: HCPCS

## 2018-07-09 PROCEDURE — 77280 THER RAD SIMULAJ FIELD SMPL: CPT

## 2018-07-09 PROCEDURE — 77401 RADIATION TX DELIVERY SUPFC: CPT

## 2018-07-09 PROCEDURE — OTHER FOLLOW UP FOR NEXT VISIT: OTHER

## 2018-07-09 NOTE — PROCEDURE: TREATMENT REGIMEN
Detail Level: Zone
Plan: Per the request of Dr. Sanchez, patient was seen today for Superficial Radiation Therapy requiring simulation (CPT® 46954) in preparation for treatment of specific diseased site(s). Simulation is necessary to determine correct patient and treatment portal positioning, deliver safe and effective radiation therapy. A high frequency ultrasound image was acquired prior to treatment today for three dimensional evaluation of tumor volume and response to treatment, in addition, geometric accuracy of field placement (CPT®  ). Physician evaluation of the ultrasound tumor depth will be ongoing through course of treatment, and is deemed medically necessary ensuring efficacy of treatment. Today’s image and setup was evaluated determining continuation of treatment with the current plan, or necessary changes as appropriate. All appropriate custom blocking and treatment parameters verified by the radiation therapist according to initial simulation. \\n\\nPer Dr. Sanchez, continued daily US guidance and simulation is required for field placement, measurement of tumor depth, progress and edema monitoring.\\n\\nEvaluation prior to treatment for response and reaction to SRT based on current fraction and cumulative dose with a visual inspection and ultrasound demonstrates a normal expected response.  RTOG Acute Radiation Morbidity Score = 0.  Superficial Radiation Therapy will continue as planned.\\n\\nUS image guidance and field placement prior to treatment delivery performed. \\n\\nRt Central [place] Cheek\\nUS Depth 0.0mm, Repop +++, [place] 0.0mm sq\\n\\nLt Superior Central Malar Cheek\\nUS depth is 0.0mm, Repop +++, [place] 0.0mm sq

## 2018-07-09 NOTE — PROCEDURE: SUPERFICIAL RADIATION TREATMENT
Functional Status: 1 (ambulatory, light activity)
Patient Positioning: Supine
Initial Radiation Treatment Planning (Will Render If Bill Simulation = Yes): The patient had a complete consultation regarding all applicable modalities for the treatment of their skin cancer and based on a variety of factors including the type of tumor, size, and location, the relevant medical history as well as local tissue factors, the functional status of the individual, the ability to perform necessary postoperative wound instructions and the need for simultaneous treatments as well as overall wound healing status, it was determined that the patient would begin radiation therapy treatment for skin cancer.  A full simulation and treatment device design was performed including the determination and formulation of appropriate simple and complex devices including lead shield of 0.762 mm thickness to form molded customized shielding to specifically correlate with the lesion size including treatment margin.  The custom lead shield is adequate to accommodate the appropriate applicator and provide adequate shielding around the treatment site.  The specific field applicator, shields, and devices both simple and complex as well as the specific patient setup is outlined below.  The patient was given a full consent for superficial radiation to both verbally and in writing and the full determination of patient's eligibility for treatment and selection is outlined on the patient eligibility and treatment selection form.  The specific superficial radiotherapy prescription was determined and was documented on the superficial radiotherapy prescription form.  A treatment calculation was also performed and documented on the treatment calculation form.  Based on the prescription, the patient was scheduled for a series of fractional treatments.
Render Text From Evaluation And Management Tab (Will Not Bill 01471): No
Fractions / Week: 3
Detail Level: Detailed
Total Number Of Fractions Rx 4: 15
Computed Treatment Time In Min (Will Render The Same As Calculated Treatment Time If Left Blank): 0.33
Intro Statement (Will Not Render If Left Blank): The patient is undergoing superficial radiation therapy for skin cancer and presents for weekly evaluation and management.  Per protocol and as documented on the flow sheet, the patient was questioned as to subjective redness, pruritus, pain, drainage, fatigue, or any other symptoms.  Objectively, the radiation area was evaluated with regards to erythema, atrophy, scale, crusting, erosion, ulceration, edema, purpura, tenderness, warmth, drainage, and any other findings.  The plan was extensively reviewed including the dose, and dosing schedule.  The simulation and clinical setup was also reviewed as was the external and any internal shields and based on this review the appropriateness and sufficiency of treatment was determined.
Fractions / Week Rx 3: 5
Day Of The Week Treatment Administered: Monday
Energy (Optional-Please Include Units): 50kV
Bill For Radiation Treatment: Yes
Fractionation Number: 7
Comments: On Target, RTOG 0
Cumulative Dose In Cgy (Optional): 1771.77
Field Size (Applicator): 2.0 cm
Treatment Time In Min (Optional): 0.34
Assessment: Appropriate reaction
Number Of Treatment Days: 1
Time Dose Fractionation (Optional- Include Units If Applicable): 86
Custom Shielding Afterword Text Will Not Be Included With Simple Simulations (X X Y Cm............): port to correlate with the lesion size, including treatment margin. The custom lead shield is adequate to accommodate the appropriate applicator and provide adequate shielding around the treatment site. Additional shielding (as noted below) is used to protect sensitive, normal tissues.
Field Size (Applicator): 2.5 cm
Treatment Margins In Cm: 0.5
Dimensions-X Axis In Cm: 0.7
Prescription Used: 2
Treatment Time / Fractionation (Optional- Include Units) Rx 2: min
Total Dose (Optional-Please Include Units) Rx 2: cGy
Time Dose Fractionation (Optional- Include Units If Applicable): 88
Shielding Size (Optional- Include Units): 1.5 x 1.5cm
Treatment Time / Fractionation (Optional- Include Units): 0.33 min
Treatment Time / Fractionation (Optional- Include Units): 0.34 min
Total Dose (Optional-Please Include Units): 5127.2cGy
Custom Shielding Preamble Text Will Not Be Included With Simple Simulations (.......... X X Y Cm): A lead shield of 0.762 mm thickness is utilized to form a molded, custom shield with a
Shielding Size (Optional- Include Units) Rx 2: 2.0 x 2.0 cm
Total Dose (Optional-Please Include Units): 5062.2 cGy
Port Dimensions-Y Axis In Cm: 1.5
Pathology Override (Pathology Will Render As Diagnosis Name If Left Blank): SCC, superficial
Daily Fractionated Dose (Optional- Include Units): 253.11 cGy
Total Number Of Fractions: 20
Treatment Margins In Cm: 0.8
Treatment Device Design After Initial Simulation Justification (Will Render If Bill For Treatment Devices = Yes): The patient is status post radiation simulation and is evaluated as to the use of additional devices for shielding and placement for radiation therapy.
Simple Simulation Preamble Text Will Be Included With Simple Simulations (.......... Indications): Simple simulation was performed today for the following reasons:
Daily Fractionated Dose (Optional- Include Units): 256.36 cGy
Dose Per Fractionation In Cgy (Optional): 256.36
Cumulative Dose In Cgy (Optional): 1794.52
Shielding Size (Optional- Include Units): 2.0 x 2.0cm
Dose / Tx In Cgy (Optional): 253.11

## 2018-07-11 ENCOUNTER — APPOINTMENT (OUTPATIENT)
Age: 68
Setting detail: DERMATOLOGY
End: 2018-07-12

## 2018-07-11 ENCOUNTER — APPOINTMENT (OUTPATIENT)
Age: 68
Setting detail: DERMATOLOGY
End: 2018-07-11

## 2018-07-11 DIAGNOSIS — L29.8 OTHER PRURITUS: ICD-10-CM

## 2018-07-11 DIAGNOSIS — L63.8 OTHER ALOPECIA AREATA: ICD-10-CM

## 2018-07-11 PROBLEM — D04.39 CARCINOMA IN SITU OF SKIN OF OTHER PARTS OF FACE: Status: ACTIVE | Noted: 2018-07-11

## 2018-07-11 PROBLEM — C44.329 SQUAMOUS CELL CARCINOMA OF SKIN OF OTHER PARTS OF FACE: Status: ACTIVE | Noted: 2018-07-11

## 2018-07-11 PROCEDURE — OTHER TREATMENT REGIMEN: OTHER

## 2018-07-11 PROCEDURE — OTHER SUPERFICIAL RADIATION TREATMENT: OTHER

## 2018-07-11 PROCEDURE — 77401 RADIATION TX DELIVERY SUPFC: CPT

## 2018-07-11 PROCEDURE — G6001 ECHO GUIDANCE RADIOTHERAPY: HCPCS

## 2018-07-11 PROCEDURE — 99212 OFFICE O/P EST SF 10 MIN: CPT | Mod: 25

## 2018-07-11 PROCEDURE — OTHER FOLLOW UP FOR NEXT VISIT: OTHER

## 2018-07-11 PROCEDURE — 77280 THER RAD SIMULAJ FIELD SMPL: CPT

## 2018-07-11 PROCEDURE — OTHER COUNSELING: OTHER

## 2018-07-11 PROCEDURE — 11901 INJECT SKIN LESIONS >7: CPT

## 2018-07-11 PROCEDURE — OTHER INTRALESIONAL KENALOG: OTHER

## 2018-07-11 ASSESSMENT — LOCATION DETAILED DESCRIPTION DERM
LOCATION DETAILED: RIGHT SUPERIOR OCCIPITAL SCALP
LOCATION DETAILED: MID-OCCIPITAL SCALP
LOCATION DETAILED: RIGHT OCCIPITAL SCALP
LOCATION DETAILED: POSTERIOR MID-PARIETAL SCALP

## 2018-07-11 ASSESSMENT — LOCATION SIMPLE DESCRIPTION DERM: LOCATION SIMPLE: POSTERIOR SCALP

## 2018-07-11 ASSESSMENT — LOCATION ZONE DERM: LOCATION ZONE: SCALP

## 2018-07-11 NOTE — PROCEDURE: SUPERFICIAL RADIATION TREATMENT
Shielding Size (Optional- Include Units) Rx 2: 2.0 x 2.0 cm
Number Of Days Off Treatment: 1
Treatment Time / Fractionation (Optional- Include Units): 0.34 min
Field Number: 2
Total Dose (Optional-Please Include Units) Rx 2: cGy
Total Number Of Fractions: 20
Fractionation Number (Evaluation): 5
[place] For Simulation Without Treatment Device Design (Simple Simulation): No
Field Size (Applicator): 2.0 cm
Fractions / Week Rx 2: 3
Dose Per Fractionation In Cgy (Optional): 253.11
Intro Statement (Will Not Render If Left Blank): The patient is undergoing superficial radiation therapy for skin cancer and presents for weekly evaluation and management.  Per protocol and as documented on the flow sheet, the patient was questioned as to subjective redness, pruritus, pain, drainage, fatigue, or any other symptoms.  Objectively, the radiation area was evaluated with regards to erythema, atrophy, scale, crusting, erosion, ulceration, edema, purpura, tenderness, warmth, drainage, and any other findings.  The plan was extensively reviewed including the dose, and dosing schedule.  The simulation and clinical setup was also reviewed as was the external and any internal shields and based on this review the appropriateness and sufficiency of treatment was determined.
Custom Shielding Afterword Text Will Not Be Included With Simple Simulations (X X Y Cm............): port to correlate with the lesion size, including treatment margin. The custom lead shield is adequate to accommodate the appropriate applicator and provide adequate shielding around the treatment site. Additional shielding (as noted below) is used to protect sensitive, normal tissues.
Comments: On Target, RTOG 0
Energy (Optional-Please Include Units) Rx 2: 50Kv
Total Number Of Fractions Rx 3: 15
Treatment Time / Fractionation (Optional- Include Units): 0.33 min
Initial Radiation Treatment Planning (Will Render If Bill Simulation = Yes): The patient had a complete consultation regarding all applicable modalities for the treatment of their skin cancer and based on a variety of factors including the type of tumor, size, and location, the relevant medical history as well as local tissue factors, the functional status of the individual, the ability to perform necessary postoperative wound instructions and the need for simultaneous treatments as well as overall wound healing status, it was determined that the patient would begin radiation therapy treatment for skin cancer.  A full simulation and treatment device design was performed including the determination and formulation of appropriate simple and complex devices including lead shield of 0.762 mm thickness to form molded customized shielding to specifically correlate with the lesion size including treatment margin.  The custom lead shield is adequate to accommodate the appropriate applicator and provide adequate shielding around the treatment site.  The specific field applicator, shields, and devices both simple and complex as well as the specific patient setup is outlined below.  The patient was given a full consent for superficial radiation to both verbally and in writing and the full determination of patient's eligibility for treatment and selection is outlined on the patient eligibility and treatment selection form.  The specific superficial radiotherapy prescription was determined and was documented on the superficial radiotherapy prescription form.  A treatment calculation was also performed and documented on the treatment calculation form.  Based on the prescription, the patient was scheduled for a series of fractional treatments.
Treatment Time / Fractionation (Optional- Include Units) Rx 2: min
Field Size (Applicator) Rx 2: 2.5 cm
Shielding Size (Optional- Include Units): 1.5 x 1.5cm
Port Dimensions-Y Axis In Cm: 1.5
Custom Shielding Preamble Text Will Not Be Included With Simple Simulations (.......... X X Y Cm): A lead shield of 0.762 mm thickness is utilized to form a molded, custom shield with a
Fractionation Number: 8
Time Dose Fractionation (Optional- Include Units If Applicable): 86
Day Of The Week Treatment Administered: Wednesday
Dimensions-Y Axis In Cm: 0.7
Treatment Device Design After Initial Simulation Justification (Will Render If Bill For Treatment Devices = Yes): The patient is status post radiation simulation and is evaluated as to the use of additional devices for shielding and placement for radiation therapy.
Treatment Margins In Cm: 0.5
Total Dose (Optional-Please Include Units): 5062.2 cGy
Pathology Override (Pathology Will Render As Diagnosis Name If Left Blank): SCC, superficial
Simple Simulation Preamble Text Will Be Included With Simple Simulations (.......... Indications): Simple simulation was performed today for the following reasons:
Bill For Radiation Treatment: Yes
Daily Fractionated Dose (Optional- Include Units): 256.36 cGy
Assessment: Appropriate reaction
Patient Positioning: Supine
Cumulative Dose In Cgy (Optional): 2050.88
Detail Level: Detailed
Computed Treatment Time In Min (Will Render The Same As Calculated Treatment Time If Left Blank): 0.33
Dose Per Fractionation In Cgy (Optional): 256.36
Daily Fractionated Dose (Optional- Include Units): 253.11 cGy
Functional Status: 1 (ambulatory, light activity)
Treatment Time In Min (Optional): 0.34
Shielding Size (Optional- Include Units): 2.0 x 2.0cm
Total Dose (Optional-Please Include Units): 5127.2cGy
Cumulative Dose In Cgy (Optional): 2024.88
Treatment Margins In Cm: 0.8
Time Dose Fractionation (Optional- Include Units If Applicable): 88

## 2018-07-11 NOTE — PROCEDURE: TREATMENT REGIMEN
Plan: Per the request of Dr. Sanchez, patient was seen today for Superficial Radiation Therapy requiring simulation (CPT® 83448) in preparation for treatment of specific diseased site(s). Simulation is necessary to determine correct patient and treatment portal positioning, deliver safe and effective radiation therapy. A high frequency ultrasound image was acquired prior to treatment today for three dimensional evaluation of tumor volume and response to treatment, in addition, geometric accuracy of field placement (CPT®  ). Physician evaluation of the ultrasound tumor depth will be ongoing through course of treatment, and is deemed medically necessary ensuring efficacy of treatment. Today’s image and setup was evaluated determining continuation of treatment with the current plan, or necessary changes as appropriate. All appropriate custom blocking and treatment parameters verified by the radiation therapist according to initial simulation. \\n\\nPer Dr. Sanchez, continued daily US guidance and simulation is required for field placement, measurement of tumor depth, progress and edema monitoring.\\n\\nEvaluation prior to treatment for response and reaction to SRT based on current fraction and cumulative dose with a visual inspection and ultrasound demonstrates a normal expected response.  RTOG Acute Radiation Morbidity Score = 0.  Superficial Radiation Therapy will continue as planned.\\n\\nUS image guidance and field placement prior to treatment delivery performed. \\n\\nRt Central Malar Cheek\\nUS Depth 0.0mm, Repop +++, [place] 0.0mm sq\\n\\nLt Superior Central Malar Cheek\\nUS depth is 0.0mm, Repop +++, [place] 0.0mm sq
Detail Level: Zone

## 2018-07-11 NOTE — PROCEDURE: INTRALESIONAL KENALOG
X Size Of Lesion In Cm (Optional): 0
Include Z78.9 (Other Specified Conditions Influencing Health Status) As An Associated Diagnosis?: No
Expiration Date For Kenalog (Optional): 9-25-18
Total Volume (Ccs): 1
Administered By (Optional): Dr. Sanchez
Kenalog Preparation: Kenalog
Detail Level: Detailed
Concentration Of Kenalog Solution Injected (Mg/Ml): 4.0
Medical Necessity Clause: This procedure was medically necessary because the lesions that were treated were:
Consent: The risks of atrophy were reviewed with the patient.
Ndc# For Kenalog Only: [id number]

## 2018-07-12 ENCOUNTER — APPOINTMENT (OUTPATIENT)
Age: 68
Setting detail: DERMATOLOGY
End: 2018-07-12

## 2018-07-12 PROBLEM — D04.39 CARCINOMA IN SITU OF SKIN OF OTHER PARTS OF FACE: Status: ACTIVE | Noted: 2018-07-12

## 2018-07-12 PROBLEM — C44.329 SQUAMOUS CELL CARCINOMA OF SKIN OF OTHER PARTS OF FACE: Status: ACTIVE | Noted: 2018-07-12

## 2018-07-12 PROCEDURE — OTHER TREATMENT REGIMEN: OTHER

## 2018-07-12 PROCEDURE — 77280 THER RAD SIMULAJ FIELD SMPL: CPT

## 2018-07-12 PROCEDURE — G6001 ECHO GUIDANCE RADIOTHERAPY: HCPCS

## 2018-07-12 PROCEDURE — OTHER FOLLOW UP FOR NEXT VISIT: OTHER

## 2018-07-12 PROCEDURE — 77401 RADIATION TX DELIVERY SUPFC: CPT

## 2018-07-12 PROCEDURE — OTHER SUPERFICIAL RADIATION TREATMENT: OTHER

## 2018-07-12 NOTE — PROCEDURE: TREATMENT REGIMEN
Plan: Per the request of Dr. Sanchez, patient was seen today for Superficial Radiation Therapy requiring simulation (CPT® 90207) in preparation for treatment of specific diseased site(s). Simulation is necessary to determine correct patient and treatment portal positioning, deliver safe and effective radiation therapy. A high frequency ultrasound image was acquired prior to treatment today for three dimensional evaluation of tumor volume and response to treatment, in addition, geometric accuracy of field placement (CPT®  ). Physician evaluation of the ultrasound tumor depth will be ongoing through course of treatment, and is deemed medically necessary ensuring efficacy of treatment. Today’s image and setup was evaluated determining continuation of treatment with the current plan, or necessary changes as appropriate. All appropriate custom blocking and treatment parameters verified by the radiation therapist according to initial simulation. \\n\\nPer Dr. Sanchez, continued daily US guidance and simulation is required for field placement, measurement of tumor depth, progress and edema monitoring.\\n\\nEvaluation prior to treatment for response and reaction to SRT based on current fraction and cumulative dose with a visual inspection and ultrasound demonstrates a normal expected response.  RTOG Acute Radiation Morbidity Score = 0.  Superficial Radiation Therapy will continue as planned.\\n\\nUS image guidance and field placement prior to treatment delivery performed. \\n\\nRt Central Malar Cheek\\nUS Depth 0.0mm, Repop +++, [place] 0.0mm sq\\n\\nLt Superior Central Malar Cheek\\nUS depth is 0.0mm, Repop +++, [place] 0.0mm sq
Detail Level: Zone

## 2018-07-12 NOTE — PROCEDURE: SUPERFICIAL RADIATION TREATMENT
Initial Radiation Treatment Planning (Will Render If Bill Simulation = Yes): The patient had a complete consultation regarding all applicable modalities for the treatment of their skin cancer and based on a variety of factors including the type of tumor, size, and location, the relevant medical history as well as local tissue factors, the functional status of the individual, the ability to perform necessary postoperative wound instructions and the need for simultaneous treatments as well as overall wound healing status, it was determined that the patient would begin radiation therapy treatment for skin cancer.  A full simulation and treatment device design was performed including the determination and formulation of appropriate simple and complex devices including lead shield of 0.762 mm thickness to form molded customized shielding to specifically correlate with the lesion size including treatment margin.  The custom lead shield is adequate to accommodate the appropriate applicator and provide adequate shielding around the treatment site.  The specific field applicator, shields, and devices both simple and complex as well as the specific patient setup is outlined below.  The patient was given a full consent for superficial radiation to both verbally and in writing and the full determination of patient's eligibility for treatment and selection is outlined on the patient eligibility and treatment selection form.  The specific superficial radiotherapy prescription was determined and was documented on the superficial radiotherapy prescription form.  A treatment calculation was also performed and documented on the treatment calculation form.  Based on the prescription, the patient was scheduled for a series of fractional treatments.
Include Rx 2 When Rendering Additional Prescriptions: No
Field Size (Applicator): 2.0 cm
Daily Fractionated Dose (Optional- Include Units): 253.11 cGy
Treatment Margins In Cm: 0.5
Custom Shielding Preamble Text Will Not Be Included With Simple Simulations (.......... X X Y Cm): A lead shield of 0.762 mm thickness is utilized to form a molded, custom shield with a
Fractions / Week Rx 2: 3
Port Dimensions-Y Axis In Cm: 2
Computed Treatment Time In Min (Will Render The Same As Calculated Treatment Time If Left Blank): 0.33
Fractionation Number (Evaluation): 5
Total Number Of Fractions Rx 4: 15
Assessment: Appropriate reaction
Energy (Optional-Please Include Units): 50kV
Patient Positioning: Supine
Dimensions-Y Axis In Cm: 1
Intro Statement (Will Not Render If Left Blank): The patient is undergoing superficial radiation therapy for skin cancer and presents for weekly evaluation and management.  Per protocol and as documented on the flow sheet, the patient was questioned as to subjective redness, pruritus, pain, drainage, fatigue, or any other symptoms.  Objectively, the radiation area was evaluated with regards to erythema, atrophy, scale, crusting, erosion, ulceration, edema, purpura, tenderness, warmth, drainage, and any other findings.  The plan was extensively reviewed including the dose, and dosing schedule.  The simulation and clinical setup was also reviewed as was the external and any internal shields and based on this review the appropriateness and sufficiency of treatment was determined.
Shielding Size (Optional- Include Units) Rx 2: 2.0 x 2.0 cm
Dimensions-X Axis In Cm: 0.7
Cumulative Dose In Cgy (Optional): 2307.24
Computed Treatment Time In Min (Will Render The Same As Calculated Treatment Time If Left Blank): 0.34
Port Dimensions-Y Axis In Cm: 1.5
Treatment Time / Fractionation (Optional- Include Units) Rx 2: min
Dose / Tx In Cgy (Optional): 253.11
Bill For Radiation Treatment: Yes
Pathology Override (Pathology Will Render As Diagnosis Name If Left Blank): SCC, superficial
Daily Fractionated Dose (Optional- Include Units) Rx 2: cGy
Field Size (Applicator): 2.5 cm
Total Dose (Optional-Please Include Units): 5127.2cGy
Detail Level: Detailed
Treatment Device Design After Initial Simulation Justification (Will Render If Bill For Treatment Devices = Yes): The patient is status post radiation simulation and is evaluated as to the use of additional devices for shielding and placement for radiation therapy.
Simple Simulation Preamble Text Will Be Included With Simple Simulations (.......... Indications): Simple simulation was performed today for the following reasons:
Comments: On Target, RTOG 0
Shielding Size (Optional- Include Units): 1.5 x 1.5cm
Dose Per Fractionation In Cgy (Optional): 256.36
Fractionation Number: 9
Total Number Of Fractions: 20
Total Dose (Optional-Please Include Units): 5062.2 cGy
Time Dose Fractionation (Optional- Include Units If Applicable): 86
Treatment Margins In Cm: 0.8
Functional Status: 1 (ambulatory, light activity)
Treatment Time / Fractionation (Optional- Include Units): 0.34 min
Treatment Time / Fractionation (Optional- Include Units): 0.33 min
Custom Shielding Afterword Text Will Not Be Included With Simple Simulations (X X Y Cm............): port to correlate with the lesion size, including treatment margin. The custom lead shield is adequate to accommodate the appropriate applicator and provide adequate shielding around the treatment site. Additional shielding (as noted below) is used to protect sensitive, normal tissues.
Cumulative Dose In Cgy (Optional): 2277.99
Shielding Size (Optional- Include Units): 2.0 x 2.0cm
Daily Fractionated Dose (Optional- Include Units): 256.36 cGy
Day Of The Week Treatment Administered: Thursday
Time Dose Fractionation (Optional- Include Units If Applicable): 88

## 2018-07-16 ENCOUNTER — APPOINTMENT (OUTPATIENT)
Age: 68
Setting detail: DERMATOLOGY
End: 2018-07-16

## 2018-07-16 PROBLEM — D04.39 CARCINOMA IN SITU OF SKIN OF OTHER PARTS OF FACE: Status: ACTIVE | Noted: 2018-07-16

## 2018-07-16 PROBLEM — C44.329 SQUAMOUS CELL CARCINOMA OF SKIN OF OTHER PARTS OF FACE: Status: ACTIVE | Noted: 2018-07-16

## 2018-07-16 PROCEDURE — G6001 ECHO GUIDANCE RADIOTHERAPY: HCPCS

## 2018-07-16 PROCEDURE — 77280 THER RAD SIMULAJ FIELD SMPL: CPT

## 2018-07-16 PROCEDURE — OTHER SUPERFICIAL RADIATION TREATMENT: OTHER

## 2018-07-16 PROCEDURE — 77401 RADIATION TX DELIVERY SUPFC: CPT

## 2018-07-16 PROCEDURE — OTHER TREATMENT REGIMEN: OTHER

## 2018-07-16 PROCEDURE — 77427 RADIATION TX MANAGEMENT X5: CPT | Mod: 25

## 2018-07-16 PROCEDURE — OTHER FOLLOW UP FOR NEXT VISIT: OTHER

## 2018-07-16 NOTE — PROCEDURE: SUPERFICIAL RADIATION TREATMENT
Computed Treatment Time In Min (Will Render The Same As Calculated Treatment Time If Left Blank): 0.33
Field Size (Applicator): 2.5 cm
Blaine For Simulation Without Treatment Device Design (Simple Simulation): No
Bill For Radiation Treatment: Yes
Initial Radiation Treatment Planning (Will Render If Bill Simulation = Yes): The patient had a complete consultation regarding all applicable modalities for the treatment of their skin cancer and based on a variety of factors including the type of tumor, size, and location, the relevant medical history as well as local tissue factors, the functional status of the individual, the ability to perform necessary postoperative wound instructions and the need for simultaneous treatments as well as overall wound healing status, it was determined that the patient would begin radiation therapy treatment for skin cancer.  A full simulation and treatment device design was performed including the determination and formulation of appropriate simple and complex devices including lead shield of 0.762 mm thickness to form molded customized shielding to specifically correlate with the lesion size including treatment margin.  The custom lead shield is adequate to accommodate the appropriate applicator and provide adequate shielding around the treatment site.  The specific field applicator, shields, and devices both simple and complex as well as the specific patient setup is outlined below.  The patient was given a full consent for superficial radiation to both verbally and in writing and the full determination of patient's eligibility for treatment and selection is outlined on the patient eligibility and treatment selection form.  The specific superficial radiotherapy prescription was determined and was documented on the superficial radiotherapy prescription form.  A treatment calculation was also performed and documented on the treatment calculation form.  Based on the prescription, the patient was scheduled for a series of fractional treatments.
Fractions / Week Rx 3: 5
Shielding Size (Optional- Include Units): 2.0 x 2.0cm
Patient Positioning: Supine
Total Dose (Optional-Please Include Units): 5127.2cGy
Detail Level: Detailed
Assessment: Appropriate reaction
Total Number Of Fractions: 20
Treatment Device Design After Initial Simulation Justification (Will Render If Bill For Treatment Devices = Yes): The patient is status post radiation simulation and is evaluated as to the use of additional devices for shielding and placement for radiation therapy.
Treatment Time / Fractionation (Optional- Include Units) Rx 2: min
Dose Per Fractionation In Cgy (Optional): 253.11
Custom Shielding Preamble Text Will Not Be Included With Simple Simulations (.......... X X Y Cm): A lead shield of 0.762 mm thickness is utilized to form a molded, custom shield with a
Treatment Time / Fractionation (Optional- Include Units): 0.34 min
Total Dose (Optional-Please Include Units): 5062.2 cGy
Energy (Optional-Please Include Units): 50kV
Dose Per Fractionation In Cgy (Optional): 256.36
Number Of Treatment Days: 1
Simple Simulation Preamble Text Will Be Included With Simple Simulations (.......... Indications): Simple simulation was performed today for the following reasons:
Total Number Of Fractions Rx 4: 15
Computed Treatment Time In Min (Will Render The Same As Calculated Treatment Time If Left Blank): 0.34
Fractionation Number: 10
Total Dose (Optional-Please Include Units) Rx 2: cGy
Time Dose Fractionation (Optional- Include Units If Applicable): 86
Port Dimensions-Y Axis In Cm: 1.5
Pathology Override (Pathology Will Render As Diagnosis Name If Left Blank): SCC, superficial
Shielding Size (Optional- Include Units) Rx 2: 2.0 x 2.0 cm
Field Size (Applicator): 2.0 cm
Prescription Used: 2
Treatment Margins In Cm: 0.8
Daily Fractionated Dose (Optional- Include Units): 253.11 cGy
Functional Status: 1 (ambulatory, light activity)
Time Dose Fractionation (Optional- Include Units If Applicable): 88
Dimensions-Y Axis In Cm: 0.7
Fractions / Week Rx 2: 3
Day Of The Week Treatment Administered: Monday
Intro Statement (Will Not Render If Left Blank): The patient is undergoing superficial radiation therapy for skin cancer and presents for weekly evaluation and management.  Per protocol and as documented on the flow sheet, the patient was questioned as to subjective redness, pruritus, pain, drainage, fatigue, or any other symptoms.  Objectively, the radiation area was evaluated with regards to erythema, atrophy, scale, crusting, erosion, ulceration, edema, purpura, tenderness, warmth, drainage, and any other findings.  The plan was extensively reviewed including the dose, and dosing schedule.  The simulation and clinical setup was also reviewed as was the external and any internal shields and based on this review the appropriateness and sufficiency of treatment was determined.
Comments: On Target, RTOG 1
Daily Fractionated Dose (Optional- Include Units): 256.36 cGy
Cumulative Dose In Cgy (Optional): 2531.1
Comments: On Target, RTOG 0
Custom Shielding Afterword Text Will Not Be Included With Simple Simulations (X X Y Cm............): port to correlate with the lesion size, including treatment margin. The custom lead shield is adequate to accommodate the appropriate applicator and provide adequate shielding around the treatment site. Additional shielding (as noted below) is used to protect sensitive, normal tissues.
Cumulative Dose In Cgy (Optional): 2563.6
Treatment Margins In Cm: 0.5
Shielding Size (Optional- Include Units): 1.5 x 1.5cm
Treatment Time / Fractionation (Optional- Include Units): 0.33 min

## 2018-07-16 NOTE — PROCEDURE: TREATMENT REGIMEN
Detail Level: Zone
Plan: Per the request of Dr. Sanchez, patient was seen today for Superficial Radiation Therapy requiring simulation (CPT® 47495) in preparation for treatment of specific diseased site(s). Simulation is necessary to determine correct patient and treatment portal positioning, deliver safe and effective radiation therapy. A high frequency ultrasound image was acquired prior to treatment today for three dimensional evaluation of tumor volume and response to treatment, in addition, geometric accuracy of field placement (CPT®  ). Physician evaluation of the ultrasound tumor depth will be ongoing through course of treatment, and is deemed medically necessary ensuring efficacy of treatment. Today’s image and setup was evaluated determining continuation of treatment with the current plan, or necessary changes as appropriate. All appropriate custom blocking and treatment parameters verified by the radiation therapist according to initial simulation. \\n\\nPer Dr. Sanchez, continued daily US guidance and simulation is required for field placement, measurement of tumor depth, progress and edema monitoring.\\n\\nEvaluation prior to treatment for response and reaction to SRT based on current fraction and cumulative dose with a visual inspection and ultrasound demonstrates a normal expected response.  RTOG Acute Radiation Morbidity Score = 0.  Superficial Radiation Therapy will continue as planned.\\n\\nUS image guidance and field placement prior to treatment delivery performed. \\n\\nRt Central Malar Cheek\\nUS Depth 0.0mm, Repop +++, [place] 0.0mm sq\\n\\nLt Superior Central Malar Cheek\\nUS depth is 0.0mm, Repop +++, [place] 0.0mm sq

## 2018-07-18 ENCOUNTER — APPOINTMENT (OUTPATIENT)
Age: 68
Setting detail: DERMATOLOGY
End: 2018-07-19

## 2018-07-18 PROBLEM — D04.39 CARCINOMA IN SITU OF SKIN OF OTHER PARTS OF FACE: Status: ACTIVE | Noted: 2018-07-18

## 2018-07-18 PROBLEM — C44.329 SQUAMOUS CELL CARCINOMA OF SKIN OF OTHER PARTS OF FACE: Status: ACTIVE | Noted: 2018-07-18

## 2018-07-18 PROCEDURE — 77280 THER RAD SIMULAJ FIELD SMPL: CPT

## 2018-07-18 PROCEDURE — OTHER SUPERFICIAL RADIATION TREATMENT: OTHER

## 2018-07-18 PROCEDURE — OTHER TREATMENT REGIMEN: OTHER

## 2018-07-18 PROCEDURE — 77401 RADIATION TX DELIVERY SUPFC: CPT

## 2018-07-18 PROCEDURE — G6001 ECHO GUIDANCE RADIOTHERAPY: HCPCS

## 2018-07-18 PROCEDURE — OTHER FOLLOW UP FOR NEXT VISIT: OTHER

## 2018-07-18 NOTE — PROCEDURE: SUPERFICIAL RADIATION TREATMENT
Day Of The Week Treatment Administered: Wednesday
Include Rx 3 When Rendering Additional Prescriptions: No
Simple Simulation Preamble Text Will Be Included With Simple Simulations (.......... Indications): Simple simulation was performed today for the following reasons:
Total Number Of Fractions Rx 2: 15
Total Dose (Optional-Please Include Units): 5062.2 cGy
Simple Simulation Afterword Text Will Be Included With Simple Simulations (Indications............): The patient had a complete consultation regarding all applicable modalities for the treatment of their skin cancer and based on a variety of factors including the type of tumor, size, and location, the relevant medical history as well as local tissue factors, the functional status of the individual, the ability to perform necessary postoperative wound instructions and the need for simultaneous treatments as well as overall wound healing status, it was determined that the patient would begin radiation therapy treatment for skin cancer.  A full simulation and treatment device design was performed including the determination and formulation of appropriate simple and complex devices including lead shield of 0.762 mm thickness to form molded customized shielding to specifically correlate with the lesion size including treatment margin.  The custom lead shield is adequate to accommodate the appropriate applicator and provide adequate shielding around the treatment site.  The specific field applicator, shields, and devices both simple and complex as well as the specific patient setup is outlined below.  The patient was given a full consent for superficial radiation to both verbally and in writing and the full determination of patient's eligibility for treatment and selection is outlined on the patient eligibility and treatment selection form.  The specific superficial radiotherapy prescription was determined and was documented on the superficial radiotherapy prescription form.  A treatment calculation was also performed and documented on the treatment calculation form.  Based on the prescription, the patient was scheduled for a series of fractional treatments.
Port Dimensions-X Axis In Cm: 1.5
Field Size (Applicator): 2.0 cm
Computed Treatment Time In Min (Will Render The Same As Calculated Treatment Time If Left Blank): 0.34
Shielding Size (Optional- Include Units) Rx 2: 2.0 x 2.0 cm
Daily Fractionated Dose (Optional- Include Units): 256.36 cGy
Total Dose (Optional-Please Include Units) Rx 2: cGy
Treatment Time / Fractionation (Optional- Include Units): 0.34 min
Comments: On Target, RTOG 1
Energy (Optional-Please Include Units) Rx 2: 50Kv
Cumulative Dose In Cgy (Optional): 2819.96
Number Of Days Off Treatment: 1
Dimensions-X Axis In Cm: 0.7
Treatment Time / Fractionation (Optional- Include Units) Rx 2: min
Fractionation Number (Evaluation): 10
Functional Status: 1 (ambulatory, light activity)
Field Size (Applicator) Rx 2: 2.5 cm
Total Dose (Optional-Please Include Units): 5127.2cGy
Time Dose Fractionation (Optional- Include Units If Applicable): 86
Dose / Tx In Cgy (Optional): 253.11
Cumulative Dose In Cgy (Optional): 2784.21
Custom Shielding Afterword Text Will Not Be Included With Simple Simulations (X X Y Cm............): port to correlate with the lesion size, including treatment margin. The custom lead shield is adequate to accommodate the appropriate applicator and provide adequate shielding around the treatment site. Additional shielding (as noted below) is used to protect sensitive, normal tissues.
Daily Fractionated Dose (Optional- Include Units): 253.11 cGy
Total Number Of Fractions: 20
Fractionation Number: 11
Time Dose Fractionation (Optional- Include Units If Applicable): 88
Shielding Size (Optional- Include Units): 1.5 x 1.5cm
Comments: On Target, RTOG 0
Shielding Size (Optional- Include Units): 2.0 x 2.0cm
Fractions / Week Rx 4: 5
Intro Statement (Will Not Render If Left Blank): The patient is undergoing superficial radiation therapy for skin cancer and presents for weekly evaluation and management.  Per protocol and as documented on the flow sheet, the patient was questioned as to subjective redness, pruritus, pain, drainage, fatigue, or any other symptoms.  Objectively, the radiation area was evaluated with regards to erythema, atrophy, scale, crusting, erosion, ulceration, edema, purpura, tenderness, warmth, drainage, and any other findings.  The plan was extensively reviewed including the dose, and dosing schedule.  The simulation and clinical setup was also reviewed as was the external and any internal shields and based on this review the appropriateness and sufficiency of treatment was determined.
Custom Shielding Preamble Text Will Not Be Included With Simple Simulations (.......... X X Y Cm): A lead shield of 0.762 mm thickness is utilized to form a molded, custom shield with a
Fractions / Week Rx 2: 3
Detail Level: Detailed
Patient Positioning: Supine
Assessment: Appropriate reaction
Bill For Radiation Treatment: Yes
Port Dimensions-X Axis In Cm: 2
Treatment Time In Min (Optional): 0.33
Dose Per Fractionation In Cgy (Optional): 256.36
Treatment Device Design After Initial Simulation Justification (Will Render If Bill For Treatment Devices = Yes): The patient is status post radiation simulation and is evaluated as to the use of additional devices for shielding and placement for radiation therapy.
Treatment Time / Fractionation (Optional- Include Units): 0.33 min
Treatment Margins In Cm: 0.5
Treatment Margins In Cm: 0.8
Pathology Override (Pathology Will Render As Diagnosis Name If Left Blank): SCC, superficial

## 2018-07-18 NOTE — PROCEDURE: TREATMENT REGIMEN
Plan: Per the request of Dr. Sanchez, patient was seen today for Superficial Radiation Therapy requiring simulation (CPT® 80532) in preparation for treatment of specific diseased site(s). Simulation is necessary to determine correct patient and treatment portal positioning, deliver safe and effective radiation therapy. A high frequency ultrasound image was acquired prior to treatment today for three dimensional evaluation of tumor volume and response to treatment, in addition, geometric accuracy of field placement (CPT®  ). Physician evaluation of the ultrasound tumor depth will be ongoing through course of treatment, and is deemed medically necessary ensuring efficacy of treatment. Today’s image and setup was evaluated determining continuation of treatment with the current plan, or necessary changes as appropriate. All appropriate custom blocking and treatment parameters verified by the radiation therapist according to initial simulation. \\n\\nPer Dr. Sanchez, continued daily US guidance and simulation is required for field placement, measurement of tumor depth, progress and edema monitoring.\\n\\nEvaluation prior to treatment for response and reaction to SRT based on current fraction and cumulative dose with a visual inspection and ultrasound demonstrates a normal expected response.  RTOG Acute Radiation Morbidity Score = 0.  Superficial Radiation Therapy will continue as planned.\\n\\nUS image guidance and field placement prior to treatment delivery performed. \\n\\nRt Central Malar Cheek\\nUS Depth 0.0mm, Repop +++, [place] 0.0mm sq\\n\\nLt Superior Central Malar Cheek\\nUS depth is 0.0mm, Repop +++, [place] 0.0mm sq
Detail Level: Zone

## 2018-07-19 ENCOUNTER — APPOINTMENT (OUTPATIENT)
Age: 68
Setting detail: DERMATOLOGY
End: 2018-07-19

## 2018-07-19 PROBLEM — D04.39 CARCINOMA IN SITU OF SKIN OF OTHER PARTS OF FACE: Status: ACTIVE | Noted: 2018-07-19

## 2018-07-19 PROBLEM — C44.329 SQUAMOUS CELL CARCINOMA OF SKIN OF OTHER PARTS OF FACE: Status: ACTIVE | Noted: 2018-07-19

## 2018-07-19 PROCEDURE — OTHER FOLLOW UP FOR NEXT VISIT: OTHER

## 2018-07-19 PROCEDURE — OTHER SUPERFICIAL RADIATION TREATMENT: OTHER

## 2018-07-19 PROCEDURE — G6001 ECHO GUIDANCE RADIOTHERAPY: HCPCS

## 2018-07-19 PROCEDURE — OTHER TREATMENT REGIMEN: OTHER

## 2018-07-19 PROCEDURE — 77280 THER RAD SIMULAJ FIELD SMPL: CPT

## 2018-07-19 PROCEDURE — 77401 RADIATION TX DELIVERY SUPFC: CPT

## 2018-07-19 NOTE — PROCEDURE: SUPERFICIAL RADIATION TREATMENT
Dose / Tx In Cgy (Optional): 256.36
Daily Fractionated Dose (Optional- Include Units): 253.11 cGy
Bill For Radiation Treatment: Yes
Field Number: 2
Shielding Size (Optional- Include Units): 2.0 x 2.0cm
Blaine For Simulation Without Treatment Device Design (Simple Simulation): No
Fractions / Week: 3
Treatment Margins In Cm: 0.8
Assessment: Appropriate reaction
Custom Shielding Afterword Text Will Not Be Included With Simple Simulations (X X Y Cm............): port to correlate with the lesion size, including treatment margin. The custom lead shield is adequate to accommodate the appropriate applicator and provide adequate shielding around the treatment site. Additional shielding (as noted below) is used to protect sensitive, normal tissues.
Functional Status: 1 (ambulatory, light activity)
Energy (Optional-Please Include Units): 50kV
Total Number Of Fractions Rx 4: 15
Simple Simulation Preamble Text Will Be Included With Simple Simulations (.......... Indications): Simple simulation was performed today for the following reasons:
Field Size (Applicator) Rx 2: 2.5 cm
Fractions / Week Rx 4: 5
Number Of Days Off Treatment: 1
Dimensions-Y Axis In Cm: 0.7
Treatment Device Design After Initial Simulation Justification (Will Render If Bill For Treatment Devices = Yes): The patient is status post radiation simulation and is evaluated as to the use of additional devices for shielding and placement for radiation therapy.
Computed Treatment Time In Min (Will Render The Same As Calculated Treatment Time If Left Blank): 0.33
Treatment Time / Fractionation (Optional- Include Units): 0.34 min
Fractionation Number: 12
Computed Treatment Time In Min (Will Render The Same As Calculated Treatment Time If Left Blank): 0.34
Initial Radiation Treatment Planning (Will Render If Bill Simulation = Yes): The patient had a complete consultation regarding all applicable modalities for the treatment of their skin cancer and based on a variety of factors including the type of tumor, size, and location, the relevant medical history as well as local tissue factors, the functional status of the individual, the ability to perform necessary postoperative wound instructions and the need for simultaneous treatments as well as overall wound healing status, it was determined that the patient would begin radiation therapy treatment for skin cancer.  A full simulation and treatment device design was performed including the determination and formulation of appropriate simple and complex devices including lead shield of 0.762 mm thickness to form molded customized shielding to specifically correlate with the lesion size including treatment margin.  The custom lead shield is adequate to accommodate the appropriate applicator and provide adequate shielding around the treatment site.  The specific field applicator, shields, and devices both simple and complex as well as the specific patient setup is outlined below.  The patient was given a full consent for superficial radiation to both verbally and in writing and the full determination of patient's eligibility for treatment and selection is outlined on the patient eligibility and treatment selection form.  The specific superficial radiotherapy prescription was determined and was documented on the superficial radiotherapy prescription form.  A treatment calculation was also performed and documented on the treatment calculation form.  Based on the prescription, the patient was scheduled for a series of fractional treatments.
Shielding Size (Optional- Include Units): 1.5 x 1.5cm
Total Dose (Optional-Please Include Units) Rx 2: cGy
Field Size (Applicator): 2.0 cm
Port Dimensions-Y Axis In Cm: 1.5
Patient Positioning: Supine
Day Of The Week Treatment Administered: Thursday
Intro Statement (Will Not Render If Left Blank): The patient is undergoing superficial radiation therapy for skin cancer and presents for weekly evaluation and management.  Per protocol and as documented on the flow sheet, the patient was questioned as to subjective redness, pruritus, pain, drainage, fatigue, or any other symptoms.  Objectively, the radiation area was evaluated with regards to erythema, atrophy, scale, crusting, erosion, ulceration, edema, purpura, tenderness, warmth, drainage, and any other findings.  The plan was extensively reviewed including the dose, and dosing schedule.  The simulation and clinical setup was also reviewed as was the external and any internal shields and based on this review the appropriateness and sufficiency of treatment was determined.
Comments: On Target, RTOG 0
Detail Level: Detailed
Dose / Tx In Cgy (Optional): 253.11
Treatment Time / Fractionation (Optional- Include Units) Rx 2: min
Fractionation Number (Evaluation): 10
Time Dose Fractionation (Optional- Include Units If Applicable): 88
Total Dose (Optional-Please Include Units): 5127.2cGy
Custom Shielding Preamble Text Will Not Be Included With Simple Simulations (.......... X X Y Cm): A lead shield of 0.762 mm thickness is utilized to form a molded, custom shield with a
Shielding Size (Optional- Include Units) Rx 2: 2.0 x 2.0 cm
Total Number Of Fractions: 20
Daily Fractionated Dose (Optional- Include Units): 256.36 cGy
Treatment Time / Fractionation (Optional- Include Units): 0.33 min
Cumulative Dose In Cgy (Optional): 3076.32
Comments: On Target, RTOG 1
Time Dose Fractionation (Optional- Include Units If Applicable): 86
Cumulative Dose In Cgy (Optional): 3037.32
Treatment Margins In Cm: 0.5
Pathology Override (Pathology Will Render As Diagnosis Name If Left Blank): SCC, superficial
Total Dose (Optional-Please Include Units): 5062.2 cGy

## 2018-07-19 NOTE — PROCEDURE: TREATMENT REGIMEN
Plan: Per the request of Dr. Sanchez, patient was seen today for Superficial Radiation Therapy requiring simulation (CPT® 62680) in preparation for treatment of specific diseased site(s). Simulation is necessary to determine correct patient and treatment portal positioning, deliver safe and effective radiation therapy. A high frequency ultrasound image was acquired prior to treatment today for three dimensional evaluation of tumor volume and response to treatment, in addition, geometric accuracy of field placement (CPT®  ). Physician evaluation of the ultrasound tumor depth will be ongoing through course of treatment, and is deemed medically necessary ensuring efficacy of treatment. Today’s image and setup was evaluated determining continuation of treatment with the current plan, or necessary changes as appropriate. All appropriate custom blocking and treatment parameters verified by the radiation therapist according to initial simulation. \\n\\nPer Dr. Sanchez, continued daily US guidance and simulation is required for field placement, measurement of tumor depth, progress and edema monitoring.\\n\\nEvaluation prior to treatment for response and reaction to SRT based on current fraction and cumulative dose with a visual inspection and ultrasound demonstrates a normal expected response.  RTOG Acute Radiation Morbidity Score = 0.  Superficial Radiation Therapy will continue as planned.\\n\\nUS image guidance and field placement prior to treatment delivery performed. \\n\\nRt Central Malar Cheek\\nUS Depth 0.0mm, Repop +++, [place] 0.0mm sq\\n\\nLt Superior Central Malar Cheek\\nUS depth is 0.0mm, Repop +++, [place] 0.0mm sq
Detail Level: Zone

## 2018-07-23 ENCOUNTER — APPOINTMENT (OUTPATIENT)
Age: 68
Setting detail: DERMATOLOGY
End: 2018-07-24

## 2018-07-23 PROBLEM — D04.39 CARCINOMA IN SITU OF SKIN OF OTHER PARTS OF FACE: Status: ACTIVE | Noted: 2018-07-23

## 2018-07-23 PROBLEM — C44.329 SQUAMOUS CELL CARCINOMA OF SKIN OF OTHER PARTS OF FACE: Status: ACTIVE | Noted: 2018-07-23

## 2018-07-23 PROCEDURE — OTHER SUPERFICIAL RADIATION TREATMENT: OTHER

## 2018-07-23 PROCEDURE — OTHER FOLLOW UP FOR NEXT VISIT: OTHER

## 2018-07-23 PROCEDURE — 77280 THER RAD SIMULAJ FIELD SMPL: CPT

## 2018-07-23 PROCEDURE — OTHER TREATMENT REGIMEN: OTHER

## 2018-07-23 PROCEDURE — G6001 ECHO GUIDANCE RADIOTHERAPY: HCPCS

## 2018-07-23 PROCEDURE — 77401 RADIATION TX DELIVERY SUPFC: CPT

## 2018-07-23 NOTE — PROCEDURE: SUPERFICIAL RADIATION TREATMENT
Total Number Of Fractions Rx 4: 15
Assessment: Appropriate reaction
Energy (Include Units): 50kV
Patient Positioning: Supine
Simple Simulation Afterword Text Will Be Included With Simple Simulations (Indications............): The patient had a complete consultation regarding all applicable modalities for the treatment of their skin cancer and based on a variety of factors including the type of tumor, size, and location, the relevant medical history as well as local tissue factors, the functional status of the individual, the ability to perform necessary postoperative wound instructions and the need for simultaneous treatments as well as overall wound healing status, it was determined that the patient would begin radiation therapy treatment for skin cancer.  A full simulation and treatment device design was performed including the determination and formulation of appropriate simple and complex devices including lead shield of 0.762 mm thickness to form molded customized shielding to specifically correlate with the lesion size including treatment margin.  The custom lead shield is adequate to accommodate the appropriate applicator and provide adequate shielding around the treatment site.  The specific field applicator, shields, and devices both simple and complex as well as the specific patient setup is outlined below.  The patient was given a full consent for superficial radiation to both verbally and in writing and the full determination of patient's eligibility for treatment and selection is outlined on the patient eligibility and treatment selection form.  The specific superficial radiotherapy prescription was determined and was documented on the superficial radiotherapy prescription form.  A treatment calculation was also performed and documented on the treatment calculation form.  Based on the prescription, the patient was scheduled for a series of fractional treatments.
Bill For Simulation And Treatment Device Design: No
Fractionation Number (Evaluation): 10
Time Dose Fractionation (Optional- Include Units If Applicable): 88
Fractions / Week: 3
Prescription Used: 1
Simple Simulation Preamble Text Will Be Included With Simple Simulations (.......... Indications): Simple simulation was performed today for the following reasons:
Pathology Override (Pathology Will Render As Diagnosis Name If Left Blank): SCC, superficial
Day Of The Week Treatment Administered: Monday
Fractionation Number: 13
Total Number Of Fractions: 20
Detail Level: Detailed
Port Dimensions-X Axis In Cm: 1.5
Functional Status: 1 (ambulatory, light activity)
Treatment Time In Min (Optional): 0.34
Fractions / Week Rx 3: 5
Time Dose Fractionation (Optional- Include Units If Applicable): 86
Field Size (Applicator): 2.0 cm
Comments: On Target, RTOG 0
Custom Shielding Afterword Text Will Not Be Included With Simple Simulations (X X Y Cm............): port to correlate with the lesion size, including treatment margin. The custom lead shield is adequate to accommodate the appropriate applicator and provide adequate shielding around the treatment site. Additional shielding (as noted below) is used to protect sensitive, normal tissues.
Field Size (Applicator): 2.5 cm
Dose Per Fractionation In Cgy (Optional): 256.36
Treatment Device Design After Initial Simulation Justification (Will Render If Bill For Treatment Devices = Yes): The patient is status post radiation simulation and is evaluated as to the use of additional devices for shielding and placement for radiation therapy.
Shielding Size (Optional- Include Units) Rx 2: 2.0 x 2.0 cm
Total Dose (Optional-Please Include Units): 5127.2cGy
Dose Per Fractionation In Cgy (Optional): 253.11
Field Number: 2
Daily Fractionated Dose (Optional- Include Units) Rx 2: cGy
Bill For Radiation Treatment: Yes
Daily Fractionated Dose (Optional- Include Units): 253.11 cGy
Treatment Time / Fractionation (Optional- Include Units): 0.33 min
Intro Statement (Will Not Render If Left Blank): The patient is undergoing superficial radiation therapy for skin cancer and presents for weekly evaluation and management.  Per protocol and as documented on the flow sheet, the patient was questioned as to subjective redness, pruritus, pain, drainage, fatigue, or any other symptoms.  Objectively, the radiation area was evaluated with regards to erythema, atrophy, scale, crusting, erosion, ulceration, edema, purpura, tenderness, warmth, drainage, and any other findings.  The plan was extensively reviewed including the dose, and dosing schedule.  The simulation and clinical setup was also reviewed as was the external and any internal shields and based on this review the appropriateness and sufficiency of treatment was determined.
Dimensions-Y Axis In Cm: 0.7
Treatment Margins In Cm: 0.8
Treatment Time / Fractionation (Optional- Include Units) Rx 2: min
Custom Shielding Preamble Text Will Not Be Included With Simple Simulations (.......... X X Y Cm): A lead shield of 0.762 mm thickness is utilized to form a molded, custom shield with a
Shielding Size (Optional- Include Units): 2.0 x 2.0cm
Comments: On Target, RTOG 1
Shielding Size (Optional- Include Units): 1.5 x 1.5cm
Treatment Time In Min (Optional): 0.33
Treatment Margins In Cm: 0.5
Cumulative Dose In Cgy (Optional): 3332.68
Treatment Time / Fractionation (Optional- Include Units): 0.34 min
Daily Fractionated Dose (Optional- Include Units): 256.36 cGy
Total Dose (Optional-Please Include Units): 5062.2 cGy
Cumulative Dose In Cgy (Optional): 3290.43

## 2018-07-23 NOTE — PROCEDURE: TREATMENT REGIMEN
Plan: Per the request of Dr. Sanchez, patient was seen today for Superficial Radiation Therapy requiring simulation (CPT® 99712) in preparation for treatment of specific diseased site(s). Simulation is necessary to determine correct patient and treatment portal positioning, deliver safe and effective radiation therapy. A high frequency ultrasound image was acquired prior to treatment today for three dimensional evaluation of tumor volume and response to treatment, in addition, geometric accuracy of field placement (CPT®  ). Physician evaluation of the ultrasound tumor depth will be ongoing through course of treatment, and is deemed medically necessary ensuring efficacy of treatment. Today’s image and setup was evaluated determining continuation of treatment with the current plan, or necessary changes as appropriate. All appropriate custom blocking and treatment parameters verified by the radiation therapist according to initial simulation. \\n\\nPer Dr. Sanchez, continued daily US guidance and simulation is required for field placement, measurement of tumor depth, progress and edema monitoring.\\n\\nEvaluation prior to treatment for response and reaction to SRT based on current fraction and cumulative dose with a visual inspection and ultrasound demonstrates a normal expected response.  RTOG Acute Radiation Morbidity Score = 0.  Superficial Radiation Therapy will continue as planned.\\n\\nUS image guidance and field placement prior to treatment delivery performed. \\n\\nRt Central Malar Cheek\\nUS Depth 0.0mm, Repop +++, [place] 0.0mm sq\\n\\nLt Superior Central Malar Cheek\\nUS depth is 0.0mm, Repop +++, [place] 0.0mm sq
Detail Level: Zone

## 2018-07-25 ENCOUNTER — APPOINTMENT (OUTPATIENT)
Age: 68
Setting detail: DERMATOLOGY
End: 2018-07-26

## 2018-07-25 PROBLEM — C44.329 SQUAMOUS CELL CARCINOMA OF SKIN OF OTHER PARTS OF FACE: Status: ACTIVE | Noted: 2018-07-25

## 2018-07-25 PROBLEM — D04.39 CARCINOMA IN SITU OF SKIN OF OTHER PARTS OF FACE: Status: ACTIVE | Noted: 2018-07-25

## 2018-07-25 PROCEDURE — 77280 THER RAD SIMULAJ FIELD SMPL: CPT

## 2018-07-25 PROCEDURE — 77401 RADIATION TX DELIVERY SUPFC: CPT

## 2018-07-25 PROCEDURE — OTHER TREATMENT REGIMEN: OTHER

## 2018-07-25 PROCEDURE — G6001 ECHO GUIDANCE RADIOTHERAPY: HCPCS

## 2018-07-25 PROCEDURE — OTHER SUPERFICIAL RADIATION TREATMENT: OTHER

## 2018-07-25 PROCEDURE — OTHER FOLLOW UP FOR NEXT VISIT: OTHER

## 2018-07-25 NOTE — PROCEDURE: TREATMENT REGIMEN
Plan: Per the request of Dr. Sanchez, patient was seen today for Superficial Radiation Therapy requiring simulation (CPT® 08507) in preparation for treatment of specific diseased site(s). Simulation is necessary to determine correct patient and treatment portal positioning, deliver safe and effective radiation therapy. A high frequency ultrasound image was acquired prior to treatment today for three dimensional evaluation of tumor volume and response to treatment, in addition, geometric accuracy of field placement (CPT®  ). Physician evaluation of the ultrasound tumor depth will be ongoing through course of treatment, and is deemed medically necessary ensuring efficacy of treatment. Today’s image and setup was evaluated determining continuation of treatment with the current plan, or necessary changes as appropriate. All appropriate custom blocking and treatment parameters verified by the radiation therapist according to initial simulation. \\n\\nPer Dr. Sanchez, continued daily US guidance and simulation is required for field placement, measurement of tumor depth, progress and edema monitoring.\\n\\nEvaluation prior to treatment for response and reaction to SRT based on current fraction and cumulative dose with a visual inspection and ultrasound demonstrates a normal expected response.  RTOG Acute Radiation Morbidity Score = 1.  Superficial Radiation Therapy will continue as planned.\\n\\nUS image guidance and field placement prior to treatment delivery performed. \\n\\nRt Central Malar Cheek\\nUS Depth 0.0mm, Repop +++, [place] 0.0mm sq\\n\\nLt Superior Central Malar Cheek\\nUS depth is 0.0mm, Repop +++, [place] 0.0mm sq
Detail Level: Zone

## 2018-07-25 NOTE — PROCEDURE: SUPERFICIAL RADIATION TREATMENT
Render Patient Eligibility And Selection In Note?: No
Assessment: Appropriate reaction
Patient Positioning: Supine
Energy (Optional-Please Include Units) Rx 2: 50Kv
Field Size (Applicator): 2.5 cm
Total Number Of Fractions Rx 3: 15
Number Of Days Off Treatment: 1
Pathology Override (Pathology Will Render As Diagnosis Name If Left Blank): SCC, superficial
Cumulative Dose In Cgy (Optional): 3543.54
Treatment Margins In Cm: 0.8
Dose / Tx In Cgy (Optional): 256.36
Treatment Time / Fractionation (Optional- Include Units) Rx 2: min
Simple Simulation Preamble Text Will Be Included With Simple Simulations (.......... Indications): Simple simulation was performed today for the following reasons:
Bill For Radiation Treatment: Yes
Field Number: 2
Day Of The Week Treatment Administered: Wednesday
Treatment Time In Min (Optional): 0.34
Total Dose (Optional-Please Include Units): 5062.2 cGy
Time Dose Fractionation (Optional- Include Units If Applicable): 86
Fractions / Week Rx 4: 5
Simple Simulation Afterword Text Will Be Included With Simple Simulations (Indications............): The patient had a complete consultation regarding all applicable modalities for the treatment of their skin cancer and based on a variety of factors including the type of tumor, size, and location, the relevant medical history as well as local tissue factors, the functional status of the individual, the ability to perform necessary postoperative wound instructions and the need for simultaneous treatments as well as overall wound healing status, it was determined that the patient would begin radiation therapy treatment for skin cancer.  A full simulation and treatment device design was performed including the determination and formulation of appropriate simple and complex devices including lead shield of 0.762 mm thickness to form molded customized shielding to specifically correlate with the lesion size including treatment margin.  The custom lead shield is adequate to accommodate the appropriate applicator and provide adequate shielding around the treatment site.  The specific field applicator, shields, and devices both simple and complex as well as the specific patient setup is outlined below.  The patient was given a full consent for superficial radiation to both verbally and in writing and the full determination of patient's eligibility for treatment and selection is outlined on the patient eligibility and treatment selection form.  The specific superficial radiotherapy prescription was determined and was documented on the superficial radiotherapy prescription form.  A treatment calculation was also performed and documented on the treatment calculation form.  Based on the prescription, the patient was scheduled for a series of fractional treatments.
Dose Per Fractionation In Cgy (Optional): 253.11
Treatment Device Design After Initial Simulation Justification (Will Render If Bill For Treatment Devices = Yes): The patient is status post radiation simulation and is evaluated as to the use of additional devices for shielding and placement for radiation therapy.
Comments: On Target, RTOG 1
Field Size (Applicator): 2.0 cm
Custom Shielding Preamble Text Will Not Be Included With Simple Simulations (.......... X X Y Cm): A lead shield of 0.762 mm thickness is utilized to form a molded, custom shield with a
Shielding Size (Optional- Include Units) Rx 2: 2.0 x 2.0 cm
Fractions / Week: 3
Total Dose (Optional-Please Include Units): 5127.2cGy
Total Dose (Optional-Please Include Units) Rx 2: cGy
Total Number Of Fractions: 20
Functional Status: 1 (ambulatory, light activity)
Daily Fractionated Dose (Optional- Include Units): 253.11 cGy
Computed Treatment Time In Min (Will Render The Same As Calculated Treatment Time If Left Blank): 0.33
Port Dimensions-Y Axis In Cm: 1.5
Dimensions-Y Axis In Cm: 0.7
Treatment Time / Fractionation (Optional- Include Units): 0.34 min
Fractionation Number (Evaluation): 10
Fractionation Number: 14
Intro Statement (Will Not Render If Left Blank): The patient is undergoing superficial radiation therapy for skin cancer and presents for weekly evaluation and management.  Per protocol and as documented on the flow sheet, the patient was questioned as to subjective redness, pruritus, pain, drainage, fatigue, or any other symptoms.  Objectively, the radiation area was evaluated with regards to erythema, atrophy, scale, crusting, erosion, ulceration, edema, purpura, tenderness, warmth, drainage, and any other findings.  The plan was extensively reviewed including the dose, and dosing schedule.  The simulation and clinical setup was also reviewed as was the external and any internal shields and based on this review the appropriateness and sufficiency of treatment was determined.
Daily Fractionated Dose (Optional- Include Units): 256.36 cGy
Cumulative Dose In Cgy (Optional): 3589.04
Detail Level: Detailed
Treatment Margins In Cm: 0.5
Shielding Size (Optional- Include Units): 1.5 x 1.5cm
Shielding Size (Optional- Include Units): 2.0 x 2.0cm
Treatment Time / Fractionation (Optional- Include Units): 0.33 min
Comments: On Target, RTOG 0
Time Dose Fractionation (Optional- Include Units If Applicable): 88
Custom Shielding Afterword Text Will Not Be Included With Simple Simulations (X X Y Cm............): port to correlate with the lesion size, including treatment margin. The custom lead shield is adequate to accommodate the appropriate applicator and provide adequate shielding around the treatment site. Additional shielding (as noted below) is used to protect sensitive, normal tissues.

## 2018-07-26 ENCOUNTER — APPOINTMENT (OUTPATIENT)
Age: 68
Setting detail: DERMATOLOGY
End: 2018-07-26

## 2018-07-26 PROBLEM — C44.329 SQUAMOUS CELL CARCINOMA OF SKIN OF OTHER PARTS OF FACE: Status: ACTIVE | Noted: 2018-07-26

## 2018-07-26 PROBLEM — D04.39 CARCINOMA IN SITU OF SKIN OF OTHER PARTS OF FACE: Status: ACTIVE | Noted: 2018-07-26

## 2018-07-26 PROCEDURE — OTHER TREATMENT REGIMEN: OTHER

## 2018-07-26 PROCEDURE — OTHER SUPERFICIAL RADIATION TREATMENT: OTHER

## 2018-07-26 PROCEDURE — 77280 THER RAD SIMULAJ FIELD SMPL: CPT

## 2018-07-26 PROCEDURE — 77401 RADIATION TX DELIVERY SUPFC: CPT

## 2018-07-26 PROCEDURE — 77427 RADIATION TX MANAGEMENT X5: CPT | Mod: 25

## 2018-07-26 PROCEDURE — G6001 ECHO GUIDANCE RADIOTHERAPY: HCPCS

## 2018-07-26 PROCEDURE — OTHER FOLLOW UP FOR NEXT VISIT: OTHER

## 2018-07-26 NOTE — PROCEDURE: SUPERFICIAL RADIATION TREATMENT
Assessment: Appropriate reaction
[place] For Simulation Without Treatment Device Design (Simple Simulation): No
Comments: On Target, RTOG 1
Pathology Override (Pathology Will Render As Diagnosis Name If Left Blank): SCC, superficial
Field Size (Applicator) Rx 2: 2.5 cm
Treatment Device Design After Initial Simulation Justification (Will Render If Bill For Treatment Devices = Yes): The patient is status post radiation simulation and is evaluated as to the use of additional devices for shielding and placement for radiation therapy.
Shielding Size (Optional- Include Units) Rx 2: 2.0 x 2.0 cm
Bill And Render Text From Evaluation And Management Tab (Will Bill [id number]): Yes
Fractionation Number: 15
Treatment Time / Fractionation (Optional- Include Units) Rx 2: min
Fractions / Week Rx 3: 5
Total Dose (Optional-Please Include Units): 5127.2cGy
Fractions / Week Rx 2: 3
Simple Simulation Afterword Text Will Be Included With Simple Simulations (Indications............): The patient had a complete consultation regarding all applicable modalities for the treatment of their skin cancer and based on a variety of factors including the type of tumor, size, and location, the relevant medical history as well as local tissue factors, the functional status of the individual, the ability to perform necessary postoperative wound instructions and the need for simultaneous treatments as well as overall wound healing status, it was determined that the patient would begin radiation therapy treatment for skin cancer.  A full simulation and treatment device design was performed including the determination and formulation of appropriate simple and complex devices including lead shield of 0.762 mm thickness to form molded customized shielding to specifically correlate with the lesion size including treatment margin.  The custom lead shield is adequate to accommodate the appropriate applicator and provide adequate shielding around the treatment site.  The specific field applicator, shields, and devices both simple and complex as well as the specific patient setup is outlined below.  The patient was given a full consent for superficial radiation to both verbally and in writing and the full determination of patient's eligibility for treatment and selection is outlined on the patient eligibility and treatment selection form.  The specific superficial radiotherapy prescription was determined and was documented on the superficial radiotherapy prescription form.  A treatment calculation was also performed and documented on the treatment calculation form.  Based on the prescription, the patient was scheduled for a series of fractional treatments.
Port Dimensions-X Axis In Cm: 1.5
Functional Status: 1 (ambulatory, light activity)
Energy (Optional-Please Include Units) Rx 2: 50Kv
Field Number: 1
Field Number: 2
Treatment Time In Min (Optional): 0.34
Treatment Time / Fractionation (Optional- Include Units): 0.33 min
Shielding Size (Optional- Include Units): 1.5 x 1.5cm
Treatment Margins In Cm: 0.5
Dose Per Fractionation In Cgy (Optional): 253.11
Daily Fractionated Dose (Optional- Include Units) Rx 2: cGy
Day Of The Week Treatment Administered: Thursday
Field Size (Applicator): 2.0 cm
Custom Shielding Afterword Text Will Not Be Included With Simple Simulations (X X Y Cm............): port to correlate with the lesion size, including treatment margin. The custom lead shield is adequate to accommodate the appropriate applicator and provide adequate shielding around the treatment site. Additional shielding (as noted below) is used to protect sensitive, normal tissues.
Simple Simulation Preamble Text Will Be Included With Simple Simulations (.......... Indications): Simple simulation was performed today for the following reasons:
Computed Treatment Time In Min (Will Render The Same As Calculated Treatment Time If Left Blank): 0.33
Shielding Size (Optional- Include Units): 2.0 x 2.0cm
Custom Shielding Preamble Text Will Not Be Included With Simple Simulations (.......... X X Y Cm): A lead shield of 0.762 mm thickness is utilized to form a molded, custom shield with a
Intro Statement (Will Not Render If Left Blank): The patient is undergoing superficial radiation therapy for skin cancer and presents for weekly evaluation and management.  Per protocol and as documented on the flow sheet, the patient was questioned as to subjective redness, pruritus, pain, drainage, fatigue, or any other symptoms.  Objectively, the radiation area was evaluated with regards to erythema, atrophy, scale, crusting, erosion, ulceration, edema, purpura, tenderness, warmth, drainage, and any other findings.  The plan was extensively reviewed including the dose, and dosing schedule.  The simulation and clinical setup was also reviewed as was the external and any internal shields and based on this review the appropriateness and sufficiency of treatment was determined.
Daily Fractionated Dose (Optional- Include Units): 253.11 cGy
Dose / Tx In Cgy (Optional): 256.36
Patient Positioning: Supine
Daily Fractionated Dose (Optional- Include Units): 256.36 cGy
Total Number Of Fractions: 20
Cumulative Dose In Cgy (Optional): 3796.65
Detail Level: Detailed
Time Dose Fractionation (Optional- Include Units If Applicable): 88
Cumulative Dose In Cgy (Optional): 3845.4
Total Dose (Optional-Please Include Units): 5062.2 cGy
Treatment Time / Fractionation (Optional- Include Units): 0.34 min
Treatment Margins In Cm: 0.8
Time Dose Fractionation (Optional- Include Units If Applicable): 86
Dimensions-X Axis In Cm: 0.7

## 2018-07-26 NOTE — PROCEDURE: TREATMENT REGIMEN
Detail Level: Zone
Plan: Per the request of Dr. Sanchez, patient was seen today for Superficial Radiation Therapy requiring simulation (CPT® 56980) in preparation for treatment of specific diseased site(s). Simulation is necessary to determine correct patient and treatment portal positioning, deliver safe and effective radiation therapy. A high frequency ultrasound image was acquired prior to treatment today for three dimensional evaluation of tumor volume and response to treatment, in addition, geometric accuracy of field placement (CPT®  ). Physician evaluation of the ultrasound tumor depth will be ongoing through course of treatment, and is deemed medically necessary ensuring efficacy of treatment. Today’s image and setup was evaluated determining continuation of treatment with the current plan, or necessary changes as appropriate. All appropriate custom blocking and treatment parameters verified by the radiation therapist according to initial simulation. \\n\\nPer Dr. Sanchez, continued daily US guidance and simulation is required for field placement, measurement of tumor depth, progress and edema monitoring.\\n\\nEvaluation prior to treatment for response and reaction to SRT based on current fraction and cumulative dose with a visual inspection and ultrasound demonstrates a normal expected response.  RTOG Acute Radiation Morbidity Score = 1.  Superficial Radiation Therapy will continue as planned.\\n\\nUS image guidance and field placement prior to treatment delivery performed. \\n\\nRt Central Malar Cheek\\nUS Depth 0.0mm, Repop +++, [place] 0.0mm sq\\n\\nLt Superior Central Malar Cheek\\nUS depth is 0.0mm, Repop +++, [place] 0.0mm sq

## 2018-07-30 ENCOUNTER — APPOINTMENT (OUTPATIENT)
Age: 68
Setting detail: DERMATOLOGY
End: 2018-07-31

## 2018-07-30 PROBLEM — C44.329 SQUAMOUS CELL CARCINOMA OF SKIN OF OTHER PARTS OF FACE: Status: ACTIVE | Noted: 2018-07-30

## 2018-07-30 PROBLEM — D04.39 CARCINOMA IN SITU OF SKIN OF OTHER PARTS OF FACE: Status: ACTIVE | Noted: 2018-07-30

## 2018-07-30 PROCEDURE — OTHER FOLLOW UP FOR NEXT VISIT: OTHER

## 2018-07-30 PROCEDURE — OTHER TREATMENT REGIMEN: OTHER

## 2018-07-30 PROCEDURE — G6001 ECHO GUIDANCE RADIOTHERAPY: HCPCS

## 2018-07-30 PROCEDURE — 77401 RADIATION TX DELIVERY SUPFC: CPT

## 2018-07-30 PROCEDURE — 77280 THER RAD SIMULAJ FIELD SMPL: CPT

## 2018-07-30 PROCEDURE — OTHER SUPERFICIAL RADIATION TREATMENT: OTHER

## 2018-07-30 NOTE — PROCEDURE: SUPERFICIAL RADIATION TREATMENT
Field Size (Applicator): 2.5 cm
Time Dose Fractionation (Optional- Include Units If Applicable): 88
Treatment Time In Min (Optional): 0.33
[place] For Simulation Without Treatment Device Design (Simple Simulation): No
Energy (Optional-Please Include Units): 50kV
Simple Simulation Afterword Text Will Be Included With Simple Simulations (Indications............): The patient had a complete consultation regarding all applicable modalities for the treatment of their skin cancer and based on a variety of factors including the type of tumor, size, and location, the relevant medical history as well as local tissue factors, the functional status of the individual, the ability to perform necessary postoperative wound instructions and the need for simultaneous treatments as well as overall wound healing status, it was determined that the patient would begin radiation therapy treatment for skin cancer.  A full simulation and treatment device design was performed including the determination and formulation of appropriate simple and complex devices including lead shield of 0.762 mm thickness to form molded customized shielding to specifically correlate with the lesion size including treatment margin.  The custom lead shield is adequate to accommodate the appropriate applicator and provide adequate shielding around the treatment site.  The specific field applicator, shields, and devices both simple and complex as well as the specific patient setup is outlined below.  The patient was given a full consent for superficial radiation to both verbally and in writing and the full determination of patient's eligibility for treatment and selection is outlined on the patient eligibility and treatment selection form.  The specific superficial radiotherapy prescription was determined and was documented on the superficial radiotherapy prescription form.  A treatment calculation was also performed and documented on the treatment calculation form.  Based on the prescription, the patient was scheduled for a series of fractional treatments.
Dose Per Fractionation In Cgy (Optional): 256.36
Cumulative Dose In Cgy (Optional): 4101.76
Total Number Of Fractions Rx 4: 15
Field Size (Applicator): 2.0 cm
Fractionation Number: 16
Custom Shielding Preamble Text Will Not Be Included With Simple Simulations (.......... X X Y Cm): A lead shield of 0.762 mm thickness is utilized to form a molded, custom shield with a
Time Dose Fractionation (Optional- Include Units If Applicable): 86
Dimensions-X Axis In Cm: 1
Treatment Margins In Cm: 0.8
Bill For Radiation Treatment: Yes
Fractions / Week: 3
Total Dose (Optional-Please Include Units): 5127.2cGy
Treatment Time / Fractionation (Optional- Include Units) Rx 2: min
Shielding Size (Optional- Include Units): 1.5 x 1.5cm
Port Dimensions-X Axis In Cm: 1.5
Treatment Device Design After Initial Simulation Justification (Will Render If Bill For Treatment Devices = Yes): The patient is status post radiation simulation and is evaluated as to the use of additional devices for shielding and placement for radiation therapy.
Custom Shielding Afterword Text Will Not Be Included With Simple Simulations (X X Y Cm............): port to correlate with the lesion size, including treatment margin. The custom lead shield is adequate to accommodate the appropriate applicator and provide adequate shielding around the treatment site. Additional shielding (as noted below) is used to protect sensitive, normal tissues.
Pathology Override (Pathology Will Render As Diagnosis Name If Left Blank): SCC, superficial
Shielding Size (Optional- Include Units) Rx 2: 2.0 x 2.0 cm
Prescription Used: 2
Dimensions-X Axis In Cm: 0.7
Computed Treatment Time In Min (Will Render The Same As Calculated Treatment Time If Left Blank): 0.34
Treatment Time / Fractionation (Optional- Include Units): 0.34 min
Treatment Margins In Cm: 0.5
Treatment Time / Fractionation (Optional- Include Units): 0.33 min
Detail Level: Detailed
Assessment: Appropriate reaction
Dose Per Fractionation In Cgy (Optional): 253.11
Patient Positioning: Supine
Day Of The Week Treatment Administered: Monday
Fractions / Week Rx 3: 5
Cumulative Dose In Cgy (Optional): 4049.76
Comments: On Target, RTOG 1
Daily Fractionated Dose (Optional- Include Units) Rx 2: cGy
Total Number Of Fractions: 20
Intro Statement (Will Not Render If Left Blank): The patient is undergoing superficial radiation therapy for skin cancer and presents for weekly evaluation and management.  Per protocol and as documented on the flow sheet, the patient was questioned as to subjective redness, pruritus, pain, drainage, fatigue, or any other symptoms.  Objectively, the radiation area was evaluated with regards to erythema, atrophy, scale, crusting, erosion, ulceration, edema, purpura, tenderness, warmth, drainage, and any other findings.  The plan was extensively reviewed including the dose, and dosing schedule.  The simulation and clinical setup was also reviewed as was the external and any internal shields and based on this review the appropriateness and sufficiency of treatment was determined.
Functional Status: 1 (ambulatory, light activity)
Daily Fractionated Dose (Optional- Include Units): 253.11 cGy
Simple Simulation Preamble Text Will Be Included With Simple Simulations (.......... Indications): Simple simulation was performed today for the following reasons:
Shielding Size (Optional- Include Units): 2.0 x 2.0cm
Total Dose (Optional-Please Include Units): 5062.2 cGy
Daily Fractionated Dose (Optional- Include Units): 256.36 cGy

## 2018-07-30 NOTE — PROCEDURE: TREATMENT REGIMEN
Plan: Per the request of Dr. Sanchez, patient was seen today for Superficial Radiation Therapy requiring simulation (CPT® 23407) in preparation for treatment of specific diseased site(s). Simulation is necessary to determine correct patient and treatment portal positioning, deliver safe and effective radiation therapy. A high frequency ultrasound image was acquired prior to treatment today for three dimensional evaluation of tumor volume and response to treatment, in addition, geometric accuracy of field placement (CPT®  ). Physician evaluation of the ultrasound tumor depth will be ongoing through course of treatment, and is deemed medically necessary ensuring efficacy of treatment. Today’s image and setup was evaluated determining continuation of treatment with the current plan, or necessary changes as appropriate. All appropriate custom blocking and treatment parameters verified by the radiation therapist according to initial simulation. \\n\\nPer Dr. Sanchez, continued daily US guidance and simulation is required for field placement, measurement of tumor depth, progress and edema monitoring.\\n\\nEvaluation prior to treatment for response and reaction to SRT based on current fraction and cumulative dose with a visual inspection and ultrasound demonstrates a normal expected response.  RTOG Acute Radiation Morbidity Score = 1.  Superficial Radiation Therapy will continue as planned.\\n\\nUS image guidance and field placement prior to treatment delivery performed. \\n\\nRt Central Malar Cheek\\nUS Depth 0.84mm(edema), Repop ++, [place] equivocal due to edema\\n\\nLt Superior Central Malar Cheek\\nUS depth is 0.0mm, Repop +++, [place] 0.0mm sq
Detail Level: Zone

## 2018-08-01 ENCOUNTER — APPOINTMENT (OUTPATIENT)
Age: 68
Setting detail: DERMATOLOGY
End: 2018-08-01

## 2018-08-01 DIAGNOSIS — L29.8 OTHER PRURITUS: ICD-10-CM

## 2018-08-01 DIAGNOSIS — L63.8 OTHER ALOPECIA AREATA: ICD-10-CM

## 2018-08-01 PROBLEM — D04.39 CARCINOMA IN SITU OF SKIN OF OTHER PARTS OF FACE: Status: ACTIVE | Noted: 2018-08-01

## 2018-08-01 PROBLEM — C44.329 SQUAMOUS CELL CARCINOMA OF SKIN OF OTHER PARTS OF FACE: Status: ACTIVE | Noted: 2018-08-01

## 2018-08-01 PROCEDURE — 77280 THER RAD SIMULAJ FIELD SMPL: CPT

## 2018-08-01 PROCEDURE — 11901 INJECT SKIN LESIONS >7: CPT

## 2018-08-01 PROCEDURE — 99213 OFFICE O/P EST LOW 20 MIN: CPT | Mod: 25

## 2018-08-01 PROCEDURE — 77401 RADIATION TX DELIVERY SUPFC: CPT

## 2018-08-01 PROCEDURE — OTHER FOLLOW UP FOR NEXT VISIT: OTHER

## 2018-08-01 PROCEDURE — OTHER SUPERFICIAL RADIATION TREATMENT: OTHER

## 2018-08-01 PROCEDURE — OTHER COUNSELING: OTHER

## 2018-08-01 PROCEDURE — OTHER TREATMENT REGIMEN: OTHER

## 2018-08-01 PROCEDURE — OTHER INTRALESIONAL KENALOG: OTHER

## 2018-08-01 PROCEDURE — G6001 ECHO GUIDANCE RADIOTHERAPY: HCPCS

## 2018-08-01 ASSESSMENT — SEVERITY ASSESSMENT OVERALL AMONG ALL PATIENTS
IN YOUR EXPERIENCE, AMONG ALL PATIENTS YOU HAVE SEEN WITH THIS CONDITION, HOW SEVERE IS THIS PATIENT'S CONDITION?: S3 (50-74% HAIR LOSS)

## 2018-08-01 ASSESSMENT — LOCATION DETAILED DESCRIPTION DERM
LOCATION DETAILED: LEFT SUPERIOR PARIETAL SCALP
LOCATION DETAILED: POSTERIOR MID-PARIETAL SCALP
LOCATION DETAILED: LEFT SUPERIOR OCCIPITAL SCALP
LOCATION DETAILED: RIGHT SUPERIOR PARIETAL SCALP

## 2018-08-01 ASSESSMENT — LOCATION SIMPLE DESCRIPTION DERM
LOCATION SIMPLE: POSTERIOR SCALP
LOCATION SIMPLE: LEFT OCCIPITAL SCALP
LOCATION SIMPLE: SCALP

## 2018-08-01 ASSESSMENT — LOCATION ZONE DERM: LOCATION ZONE: SCALP

## 2018-08-01 ASSESSMENT — ITCH INTENSITY: HOW SEVERE IS YOUR ITCHING?: 4

## 2018-08-01 NOTE — PROCEDURE: FOLLOW UP FOR NEXT VISIT
Scheduled For Follow Up In (Optional): 1 month
Instructions (Optional): Reevaluate treatment regime
Detail Level: Simple
Instructions (Optional): Reevaluate treatment regime/ injection #4
Scheduled For Follow Up In (Optional): 3 weeks

## 2018-08-01 NOTE — PROCEDURE: SUPERFICIAL RADIATION TREATMENT
Energy (Include Units): 50kV
Render Additional Prescriptions In Note?: No
Dimensions-X Axis In Cm: 0.7
Treatment Time / Fractionation (Optional- Include Units) Rx 2: min
Simple Simulation Afterword Text Will Be Included With Simple Simulations (Indications............): The patient had a complete consultation regarding all applicable modalities for the treatment of their skin cancer and based on a variety of factors including the type of tumor, size, and location, the relevant medical history as well as local tissue factors, the functional status of the individual, the ability to perform necessary postoperative wound instructions and the need for simultaneous treatments as well as overall wound healing status, it was determined that the patient would begin radiation therapy treatment for skin cancer.  A full simulation and treatment device design was performed including the determination and formulation of appropriate simple and complex devices including lead shield of 0.762 mm thickness to form molded customized shielding to specifically correlate with the lesion size including treatment margin.  The custom lead shield is adequate to accommodate the appropriate applicator and provide adequate shielding around the treatment site.  The specific field applicator, shields, and devices both simple and complex as well as the specific patient setup is outlined below.  The patient was given a full consent for superficial radiation to both verbally and in writing and the full determination of patient's eligibility for treatment and selection is outlined on the patient eligibility and treatment selection form.  The specific superficial radiotherapy prescription was determined and was documented on the superficial radiotherapy prescription form.  A treatment calculation was also performed and documented on the treatment calculation form.  Based on the prescription, the patient was scheduled for a series of fractional treatments.
Intro Statement (Will Not Render If Left Blank): The patient is undergoing superficial radiation therapy for skin cancer and presents for weekly evaluation and management.  Per protocol and as documented on the flow sheet, the patient was questioned as to subjective redness, pruritus, pain, drainage, fatigue, or any other symptoms.  Objectively, the radiation area was evaluated with regards to erythema, atrophy, scale, crusting, erosion, ulceration, edema, purpura, tenderness, warmth, drainage, and any other findings.  The plan was extensively reviewed including the dose, and dosing schedule.  The simulation and clinical setup was also reviewed as was the external and any internal shields and based on this review the appropriateness and sufficiency of treatment was determined.
Fractions / Week: 3
Field Size (Applicator): 2.0 cm
Dose / Tx In Cgy (Optional): 253.11
Treatment Time In Min (Optional): 0.34
Shielding Size (Optional- Include Units) Rx 2: 2.0 x 2.0 cm
Detail Level: Detailed
Treatment Time / Fractionation (Optional- Include Units): 0.33 min
Dose Per Fractionation In Cgy (Optional): 256.36
Bill For Radiation Treatment: Yes
Dimensions-Y Axis In Cm: 1
Assessment: Appropriate reaction
Treatment Margins In Cm: 0.5
Time Dose Fractionation (Optional- Include Units If Applicable): 88
Total Dose (Optional-Please Include Units) Rx 2: cGy
Ssd In Cm (Optional): 15
Patient Positioning: Supine
Total Number Of Fractions: 20
Daily Fractionated Dose (Optional- Include Units): 253.11 cGy
Treatment Device Design After Initial Simulation Justification (Will Render If Bill For Treatment Devices = Yes): The patient is status post radiation simulation and is evaluated as to the use of additional devices for shielding and placement for radiation therapy.
Fractionation Number: 17
Fractions / Week Rx 4: 5
Simple Simulation Preamble Text Will Be Included With Simple Simulations (.......... Indications): Simple simulation was performed today for the following reasons:
Field Number: 2
Treatment Time / Fractionation (Optional- Include Units): 0.34 min
Custom Shielding Preamble Text Will Not Be Included With Simple Simulations (.......... X X Y Cm): A lead shield of 0.762 mm thickness is utilized to form a molded, custom shield with a
Comments: On Target, RTOG 1
Daily Fractionated Dose (Optional- Include Units): 256.36 cGy
Day Of The Week Treatment Administered: Wednesday
Pathology Override (Pathology Will Render As Diagnosis Name If Left Blank): SCC, superficial
Cumulative Dose In Cgy (Optional): 4358.12
Treatment Margins In Cm: 0.8
Field Size (Applicator): 2.5 cm
Port Dimensions-X Axis In Cm: 1.5
Shielding Size (Optional- Include Units): 1.5 x 1.5cm
Functional Status: 1 (ambulatory, light activity)
Computed Treatment Time In Min (Will Render The Same As Calculated Treatment Time If Left Blank): 0.33
Custom Shielding Afterword Text Will Not Be Included With Simple Simulations (X X Y Cm............): port to correlate with the lesion size, including treatment margin. The custom lead shield is adequate to accommodate the appropriate applicator and provide adequate shielding around the treatment site. Additional shielding (as noted below) is used to protect sensitive, normal tissues.
Cumulative Dose In Cgy (Optional): 4302.87
Time Dose Fractionation (Optional- Include Units If Applicable): 86
Total Dose (Optional-Please Include Units): 5127.2cGy
Shielding Size (Optional- Include Units): 2.0 x 2.0cm
Total Dose (Optional-Please Include Units): 5062.2 cGy

## 2018-08-01 NOTE — PROCEDURE: TREATMENT REGIMEN
Plan: Per the request of Dr. Sanchez, patient was seen today for Superficial Radiation Therapy requiring simulation (CPT® 57030) in preparation for treatment of specific diseased site(s). Simulation is necessary to determine correct patient and treatment portal positioning, deliver safe and effective radiation therapy. A high frequency ultrasound image was acquired prior to treatment today for three dimensional evaluation of tumor volume and response to treatment, in addition, geometric accuracy of field placement (CPT®  ). Physician evaluation of the ultrasound tumor depth will be ongoing through course of treatment, and is deemed medically necessary ensuring efficacy of treatment. Today’s image and setup was evaluated determining continuation of treatment with the current plan, or necessary changes as appropriate. All appropriate custom blocking and treatment parameters verified by the radiation therapist according to initial simulation. \\n\\nPer Dr. Sanchez, continued daily US guidance and simulation is required for field placement, measurement of tumor depth, progress and edema monitoring.\\n\\nEvaluation prior to treatment for response and reaction to SRT based on current fraction and cumulative dose with a visual inspection and ultrasound demonstrates a normal expected response.  RTOG Acute Radiation Morbidity Score = 1.  Superficial Radiation Therapy will continue as planned.\\n\\nUS image guidance and field placement prior to treatment delivery performed. \\n\\nRt Central Malar Cheek\\nUS Depth 0.0mm, Repop +++, [place] 0.0mm sq\\n\\nLt Superior Central Malar Cheek\\nUS depth is 0.0mm, Repop +++, [place] 0.0mm sq
Detail Level: Zone

## 2018-08-01 NOTE — PROCEDURE: INTRALESIONAL KENALOG
Medical Necessity Clause: This procedure was medically necessary because the lesions that were treated were:
Expiration Date For Kenalog (Optional): 9/25/18
X Size Of Lesion In Cm (Optional): 0
Concentration Of Kenalog Solution Injected (Mg/Ml): 4.0
Detail Level: Detailed
Include Z78.9 (Other Specified Conditions Influencing Health Status) As An Associated Diagnosis?: No
Lot # For Kenalog (Optional): [id number]
Ndc# For Kenalog Only: 4611-1819-89
Treatment Number (Optional): 3
Total Volume (Ccs): 1
Kenalog Preparation: Kenalog
Consent: The risks of atrophy were reviewed with the patient.
Administered By (Optional): DR. CAR

## 2018-08-02 ENCOUNTER — APPOINTMENT (OUTPATIENT)
Age: 68
Setting detail: DERMATOLOGY
End: 2018-08-03

## 2018-08-02 PROBLEM — C44.329 SQUAMOUS CELL CARCINOMA OF SKIN OF OTHER PARTS OF FACE: Status: ACTIVE | Noted: 2018-08-02

## 2018-08-02 PROBLEM — D04.39 CARCINOMA IN SITU OF SKIN OF OTHER PARTS OF FACE: Status: ACTIVE | Noted: 2018-08-02

## 2018-08-02 PROCEDURE — OTHER SUPERFICIAL RADIATION TREATMENT: OTHER

## 2018-08-02 PROCEDURE — OTHER TREATMENT REGIMEN: OTHER

## 2018-08-02 PROCEDURE — 77280 THER RAD SIMULAJ FIELD SMPL: CPT

## 2018-08-02 PROCEDURE — OTHER FOLLOW UP FOR NEXT VISIT: OTHER

## 2018-08-02 PROCEDURE — G6001 ECHO GUIDANCE RADIOTHERAPY: HCPCS

## 2018-08-02 PROCEDURE — 77401 RADIATION TX DELIVERY SUPFC: CPT

## 2018-08-02 NOTE — PROCEDURE: TREATMENT REGIMEN
Detail Level: Zone
Plan: Per the request of Dr. Sanchez, patient was seen today for Superficial Radiation Therapy requiring simulation (CPT® 35100) in preparation for treatment of specific diseased site(s). Simulation is necessary to determine correct patient and treatment portal positioning, deliver safe and effective radiation therapy. A high frequency ultrasound image was acquired prior to treatment today for three dimensional evaluation of tumor volume and response to treatment, in addition, geometric accuracy of field placement (CPT®  ). Physician evaluation of the ultrasound tumor depth will be ongoing through course of treatment, and is deemed medically necessary ensuring efficacy of treatment. Today’s image and setup was evaluated determining continuation of treatment with the current plan, or necessary changes as appropriate. All appropriate custom blocking and treatment parameters verified by the radiation therapist according to initial simulation. \\n\\nPer Dr. Sanchez, continued daily US guidance and simulation is required for field placement, measurement of tumor depth, progress and edema monitoring.\\n\\nEvaluation prior to treatment for response and reaction to SRT based on current fraction and cumulative dose with a visual inspection and ultrasound demonstrates a normal expected response.  RTOG Acute Radiation Morbidity Score = 1.  Superficial Radiation Therapy will continue as planned.\\n\\nUS image guidance and field placement prior to treatment delivery performed. \\n\\nRt Central Malar Cheek\\nUS Depth 0.0mm, Repop +++, [place] 0.0mm sq\\n\\nLt Superior Central Malar Cheek\\nUS depth is 0.0mm, Repop +++, [place] 0.0mm sq

## 2018-08-02 NOTE — PROCEDURE: SUPERFICIAL RADIATION TREATMENT
Fractionation Number: 18
Prescription Used: 2
Number Of Days Off Treatment: 1
Render Patient Eligibility And Selection In Note?: No
Treatment Time In Min (Optional): 0.34
Fractions / Week Rx 4: 5
Port Dimensions-X Axis In Cm: 1.5
Dose Per Fractionation In Cgy (Optional): 256.36
Treatment Time In Min (Optional): 0.33
Total Number Of Fractions Rx 3: 15
Energy (Include Units): 50kV
Shielding Size (Optional- Include Units) Rx 2: 2.0 x 2.0 cm
Pathology Override (Pathology Will Render As Diagnosis Name If Left Blank): SCC, superficial
Custom Shielding Afterword Text Will Not Be Included With Simple Simulations (X X Y Cm............): port to correlate with the lesion size, including treatment margin. The custom lead shield is adequate to accommodate the appropriate applicator and provide adequate shielding around the treatment site. Additional shielding (as noted below) is used to protect sensitive, normal tissues.
Initial Radiation Treatment Planning (Will Render If Bill Simulation = Yes): The patient had a complete consultation regarding all applicable modalities for the treatment of their skin cancer and based on a variety of factors including the type of tumor, size, and location, the relevant medical history as well as local tissue factors, the functional status of the individual, the ability to perform necessary postoperative wound instructions and the need for simultaneous treatments as well as overall wound healing status, it was determined that the patient would begin radiation therapy treatment for skin cancer.  A full simulation and treatment device design was performed including the determination and formulation of appropriate simple and complex devices including lead shield of 0.762 mm thickness to form molded customized shielding to specifically correlate with the lesion size including treatment margin.  The custom lead shield is adequate to accommodate the appropriate applicator and provide adequate shielding around the treatment site.  The specific field applicator, shields, and devices both simple and complex as well as the specific patient setup is outlined below.  The patient was given a full consent for superficial radiation to both verbally and in writing and the full determination of patient's eligibility for treatment and selection is outlined on the patient eligibility and treatment selection form.  The specific superficial radiotherapy prescription was determined and was documented on the superficial radiotherapy prescription form.  A treatment calculation was also performed and documented on the treatment calculation form.  Based on the prescription, the patient was scheduled for a series of fractional treatments.
Total Number Of Fractions: 20
Treatment Time / Fractionation (Optional- Include Units): 0.34 min
Total Dose (Optional-Please Include Units) Rx 2: cGy
Bill For Radiation Treatment: Yes
Treatment Time / Fractionation (Optional- Include Units) Rx 2: min
Time Dose Fractionation (Optional- Include Units If Applicable): 88
Daily Fractionated Dose (Optional- Include Units): 253.11 cGy
Field Size (Applicator) Rx 2: 2.5 cm
Functional Status: 1 (ambulatory, light activity)
Daily Fractionated Dose (Optional- Include Units): 256.36 cGy
Shielding Size (Optional- Include Units): 2.0 x 2.0cm
Dimensions-X Axis In Cm: 0.7
Patient Positioning: Supine
Treatment Time / Fractionation (Optional- Include Units): 0.33 min
Treatment Device Design After Initial Simulation Justification (Will Render If Bill For Treatment Devices = Yes): The patient is status post radiation simulation and is evaluated as to the use of additional devices for shielding and placement for radiation therapy.
Day Of The Week Treatment Administered: Thursday
Treatment Margins In Cm: 0.5
Fractions / Week Rx 2: 3
Total Dose (Optional-Please Include Units): 5127.2cGy
Field Size (Applicator): 2.0 cm
Shielding Size (Optional- Include Units): 1.5 x 1.5cm
Custom Shielding Preamble Text Will Not Be Included With Simple Simulations (.......... X X Y Cm): A lead shield of 0.762 mm thickness is utilized to form a molded, custom shield with a
Cumulative Dose In Cgy (Optional): 4614.48
Time Dose Fractionation (Optional- Include Units If Applicable): 86
Cumulative Dose In Cgy (Optional): 4555.98
Simple Simulation Preamble Text Will Be Included With Simple Simulations (.......... Indications): Simple simulation was performed today for the following reasons:
Intro Statement (Will Not Render If Left Blank): The patient is undergoing superficial radiation therapy for skin cancer and presents for weekly evaluation and management.  Per protocol and as documented on the flow sheet, the patient was questioned as to subjective redness, pruritus, pain, drainage, fatigue, or any other symptoms.  Objectively, the radiation area was evaluated with regards to erythema, atrophy, scale, crusting, erosion, ulceration, edema, purpura, tenderness, warmth, drainage, and any other findings.  The plan was extensively reviewed including the dose, and dosing schedule.  The simulation and clinical setup was also reviewed as was the external and any internal shields and based on this review the appropriateness and sufficiency of treatment was determined.
Treatment Margins In Cm: 0.8
Assessment: Appropriate reaction
Comments: On Target, RTOG 1
Dose Per Fractionation In Cgy (Optional): 253.11
Detail Level: Detailed
Total Dose (Optional-Please Include Units): 5062.2 cGy

## 2018-08-06 ENCOUNTER — APPOINTMENT (OUTPATIENT)
Age: 68
Setting detail: DERMATOLOGY
End: 2018-08-08

## 2018-08-06 PROBLEM — C44.329 SQUAMOUS CELL CARCINOMA OF SKIN OF OTHER PARTS OF FACE: Status: ACTIVE | Noted: 2018-08-06

## 2018-08-06 PROBLEM — D04.39 CARCINOMA IN SITU OF SKIN OF OTHER PARTS OF FACE: Status: ACTIVE | Noted: 2018-08-06

## 2018-08-06 PROCEDURE — G6001 ECHO GUIDANCE RADIOTHERAPY: HCPCS

## 2018-08-06 PROCEDURE — OTHER TREATMENT REGIMEN: OTHER

## 2018-08-06 PROCEDURE — 77401 RADIATION TX DELIVERY SUPFC: CPT

## 2018-08-06 PROCEDURE — OTHER SUPERFICIAL RADIATION TREATMENT: OTHER

## 2018-08-06 PROCEDURE — 77280 THER RAD SIMULAJ FIELD SMPL: CPT

## 2018-08-06 PROCEDURE — OTHER FOLLOW UP FOR NEXT VISIT: OTHER

## 2018-08-06 NOTE — PROCEDURE: SUPERFICIAL RADIATION TREATMENT
Total Number Of Fractions: 20
Render Prescriptions In Note?: No
Prescription Used: 1
Time Dose Fractionation (Optional- Include Units If Applicable): 86
Treatment Margins In Cm: 0.8
Fractionation Number: 19
Detail Level: Detailed
Dimensions-X Axis In Cm: 0.7
Simple Simulation Preamble Text Will Be Included With Simple Simulations (.......... Indications): Simple simulation was performed today for the following reasons:
Fractionation Number (Evaluation): 15
Comments: On Target, RTOG 1
Energy (Optional-Please Include Units) Rx 2: 50Kv
Cumulative Dose In Cgy (Optional): 4809.09
Custom Shielding Preamble Text Will Not Be Included With Simple Simulations (.......... X X Y Cm): A lead shield of 0.762 mm thickness is utilized to form a molded, custom shield with a
Time Dose Fractionation (Optional- Include Units If Applicable): 88
Treatment Time / Fractionation (Optional- Include Units): 0.34 min
Cumulative Dose In Cgy (Optional): 4870.84
Computed Treatment Time In Min (Will Render The Same As Calculated Treatment Time If Left Blank): 0.33
Treatment Device Design After Initial Simulation Justification (Will Render If Bill For Treatment Devices = Yes): The patient is status post radiation simulation and is evaluated as to the use of additional devices for shielding and placement for radiation therapy.
Fractions / Week: 3
Field Size (Applicator) Rx 2: 2.5 cm
Field Size (Applicator): 2.0 cm
Custom Shielding Afterword Text Will Not Be Included With Simple Simulations (X X Y Cm............): port to correlate with the lesion size, including treatment margin. The custom lead shield is adequate to accommodate the appropriate applicator and provide adequate shielding around the treatment site. Additional shielding (as noted below) is used to protect sensitive, normal tissues.
Day Of The Week Treatment Administered: Monday
Patient Positioning: Supine
Shielding Size (Optional- Include Units) Rx 2: 2.0 x 2.0 cm
Treatment Time / Fractionation (Optional- Include Units) Rx 2: min
Simple Simulation Afterword Text Will Be Included With Simple Simulations (Indications............): The patient had a complete consultation regarding all applicable modalities for the treatment of their skin cancer and based on a variety of factors including the type of tumor, size, and location, the relevant medical history as well as local tissue factors, the functional status of the individual, the ability to perform necessary postoperative wound instructions and the need for simultaneous treatments as well as overall wound healing status, it was determined that the patient would begin radiation therapy treatment for skin cancer.  A full simulation and treatment device design was performed including the determination and formulation of appropriate simple and complex devices including lead shield of 0.762 mm thickness to form molded customized shielding to specifically correlate with the lesion size including treatment margin.  The custom lead shield is adequate to accommodate the appropriate applicator and provide adequate shielding around the treatment site.  The specific field applicator, shields, and devices both simple and complex as well as the specific patient setup is outlined below.  The patient was given a full consent for superficial radiation to both verbally and in writing and the full determination of patient's eligibility for treatment and selection is outlined on the patient eligibility and treatment selection form.  The specific superficial radiotherapy prescription was determined and was documented on the superficial radiotherapy prescription form.  A treatment calculation was also performed and documented on the treatment calculation form.  Based on the prescription, the patient was scheduled for a series of fractional treatments.
Bill For Radiation Treatment: Yes
Daily Fractionated Dose (Optional- Include Units) Rx 2: cGy
Functional Status: 1 (ambulatory, light activity)
Dose Per Fractionation In Cgy (Optional): 253.11
Pathology Override (Pathology Will Render As Diagnosis Name If Left Blank): SCC, superficial
Shielding Size (Optional- Include Units): 1.5 x 1.5cm
Computed Treatment Time In Min (Will Render The Same As Calculated Treatment Time If Left Blank): 0.34
Dose / Tx In Cgy (Optional): 256.36
Assessment: Appropriate reaction
Port Dimensions-Y Axis In Cm: 2
Fractions / Week Rx 4: 5
Total Dose (Optional-Please Include Units): 5062.2 cGy
Daily Fractionated Dose (Optional- Include Units): 253.11 cGy
Intro Statement (Will Not Render If Left Blank): The patient is undergoing superficial radiation therapy for skin cancer and presents for weekly evaluation and management.  Per protocol and as documented on the flow sheet, the patient was questioned as to subjective redness, pruritus, pain, drainage, fatigue, or any other symptoms.  Objectively, the radiation area was evaluated with regards to erythema, atrophy, scale, crusting, erosion, ulceration, edema, purpura, tenderness, warmth, drainage, and any other findings.  The plan was extensively reviewed including the dose, and dosing schedule.  The simulation and clinical setup was also reviewed as was the external and any internal shields and based on this review the appropriateness and sufficiency of treatment was determined.
Shielding Size (Optional- Include Units): 2.0 x 2.0cm
Total Dose (Optional-Please Include Units): 5127.2cGy
Port Dimensions-Y Axis In Cm: 1.5
Treatment Margins In Cm: 0.5
Treatment Time / Fractionation (Optional- Include Units): 0.33 min
Daily Fractionated Dose (Optional- Include Units): 256.36 cGy

## 2018-08-06 NOTE — PROCEDURE: TREATMENT REGIMEN
Plan: Per the request of Dr. Sanchez, patient was seen today for Superficial Radiation Therapy requiring simulation (CPT® 77148) in preparation for treatment of specific diseased site(s). Simulation is necessary to determine correct patient and treatment portal positioning, deliver safe and effective radiation therapy. A high frequency ultrasound image was acquired prior to treatment today for three dimensional evaluation of tumor volume and response to treatment, in addition, geometric accuracy of field placement (CPT®  ). Physician evaluation of the ultrasound tumor depth will be ongoing through course of treatment, and is deemed medically necessary ensuring efficacy of treatment. Today’s image and setup was evaluated determining continuation of treatment with the current plan, or necessary changes as appropriate. All appropriate custom blocking and treatment parameters verified by the radiation therapist according to initial simulation. \\n\\nPer Dr. Sanchez, continued daily US guidance and simulation is required for field placement, measurement of tumor depth, progress and edema monitoring.\\n\\nEvaluation prior to treatment for response and reaction to SRT based on current fraction and cumulative dose with a visual inspection and ultrasound demonstrates a normal expected response.  RTOG Acute Radiation Morbidity Score = 1.  Superficial Radiation Therapy will continue as planned.\\n\\nUS image guidance and field placement prior to treatment delivery performed. \\n\\nRt Central Malar Cheek\\nUS Depth 0.0mm, Repop +++, [place] 0.0mm sq\\n\\nLt Superior Central Malar Cheek\\nUS depth is 0.5mm, Repop ++, [place] equivocal
Detail Level: Zone

## 2018-08-08 ENCOUNTER — APPOINTMENT (OUTPATIENT)
Age: 68
Setting detail: DERMATOLOGY
End: 2018-08-08

## 2018-08-08 PROBLEM — C44.329 SQUAMOUS CELL CARCINOMA OF SKIN OF OTHER PARTS OF FACE: Status: ACTIVE | Noted: 2018-08-08

## 2018-08-08 PROBLEM — D04.39 CARCINOMA IN SITU OF SKIN OF OTHER PARTS OF FACE: Status: ACTIVE | Noted: 2018-08-08

## 2018-08-08 PROCEDURE — G6001 ECHO GUIDANCE RADIOTHERAPY: HCPCS

## 2018-08-08 PROCEDURE — 77401 RADIATION TX DELIVERY SUPFC: CPT

## 2018-08-08 PROCEDURE — OTHER FOLLOW UP FOR NEXT VISIT: OTHER

## 2018-08-08 PROCEDURE — OTHER SUPERFICIAL RADIATION TREATMENT: OTHER

## 2018-08-08 PROCEDURE — 77280 THER RAD SIMULAJ FIELD SMPL: CPT

## 2018-08-08 PROCEDURE — OTHER TREATMENT REGIMEN: OTHER

## 2018-08-08 NOTE — PROCEDURE: TREATMENT REGIMEN
Plan: Per the request of Dr. Sanchez, patient was seen today for Superficial Radiation Therapy requiring simulation (CPT® 79894) in preparation for treatment of specific diseased site(s). Simulation is necessary to determine correct patient and treatment portal positioning, deliver safe and effective radiation therapy. A high frequency ultrasound image was acquired prior to treatment today for three dimensional evaluation of tumor volume and response to treatment, in addition, geometric accuracy of field placement (CPT®  ). Physician evaluation of the ultrasound tumor depth will be ongoing through course of treatment, and is deemed medically necessary ensuring efficacy of treatment. Today’s image and setup was evaluated determining continuation of treatment with the current plan, or necessary changes as appropriate. All appropriate custom blocking and treatment parameters verified by the radiation therapist according to initial simulation. \\n\\nPer Dr. Sanchez, continued daily US guidance and simulation is required for field placement, measurement of tumor depth, progress and edema monitoring.\\n\\nEvaluation prior to treatment for response and reaction to SRT based on current fraction and cumulative dose with a visual inspection and ultrasound demonstrates a normal expected response.  RTOG Acute Radiation Morbidity Score = 1.  Superficial Radiation Therapy will continue as planned.\\n\\nUS image guidance and field placement prior to treatment delivery performed. \\n\\nRt Central Malar Cheek\\nUS Depth 0.0mm, Repop +++, [place] 0.0mm sq\\n\\nLt Superior Central Malar Cheek\\nUS depth is 0.0mm, Repop +++, [place] 0.0mm sq
Detail Level: Zone

## 2018-08-08 NOTE — PROCEDURE: SUPERFICIAL RADIATION TREATMENT
Bill For Radiation Treatment: Yes
Pathology Override (Pathology Will Render As Diagnosis Name If Left Blank): SCC, superficial
Energy (Optional-Please Include Units): 50kV
Fractions / Week Rx 2: 3
Render Patient Eligibility And Selection In Note?: No
Port Dimensions-Y Axis In Cm: 1.5
Field Size (Applicator) Rx 2: 2.5 cm
Custom Shielding Preamble Text Will Not Be Included With Simple Simulations (.......... X X Y Cm): A lead shield of 0.762 mm thickness is utilized to form a molded, custom shield with a
Patient Positioning: Supine
Shielding Size (Optional- Include Units): 1.5 x 1.5cm
Computed Treatment Time In Min (Will Render The Same As Calculated Treatment Time If Left Blank): 0.34
Number Of Treatment Days: 1
Ssd In Cm (Optional): 15
Dose Per Fractionation In Cgy (Optional): 253.11
Total Dose (Optional-Please Include Units) Rx 2: cGy
Treatment Time / Fractionation (Optional- Include Units) Rx 2: min
Treatment Time / Fractionation (Optional- Include Units): 0.34 min
Total Dose (Optional-Please Include Units): 5127.2cGy
Treatment Time / Fractionation (Optional- Include Units): 0.33 min
Initial Radiation Treatment Planning (Will Render If Bill Simulation = Yes): The patient had a complete consultation regarding all applicable modalities for the treatment of their skin cancer and based on a variety of factors including the type of tumor, size, and location, the relevant medical history as well as local tissue factors, the functional status of the individual, the ability to perform necessary postoperative wound instructions and the need for simultaneous treatments as well as overall wound healing status, it was determined that the patient would begin radiation therapy treatment for skin cancer.  A full simulation and treatment device design was performed including the determination and formulation of appropriate simple and complex devices including lead shield of 0.762 mm thickness to form molded customized shielding to specifically correlate with the lesion size including treatment margin.  The custom lead shield is adequate to accommodate the appropriate applicator and provide adequate shielding around the treatment site.  The specific field applicator, shields, and devices both simple and complex as well as the specific patient setup is outlined below.  The patient was given a full consent for superficial radiation to both verbally and in writing and the full determination of patient's eligibility for treatment and selection is outlined on the patient eligibility and treatment selection form.  The specific superficial radiotherapy prescription was determined and was documented on the superficial radiotherapy prescription form.  A treatment calculation was also performed and documented on the treatment calculation form.  Based on the prescription, the patient was scheduled for a series of fractional treatments.
Fractionation Number: 20
Fractions / Week Rx 3: 5
Time Dose Fractionation (Optional- Include Units If Applicable): 88
Time Dose Fractionation (Optional- Include Units If Applicable): 86
Total Dose (Optional-Please Include Units): 5062.2 cGy
Intro Statement (Will Not Render If Left Blank): The patient is undergoing superficial radiation therapy for skin cancer and presents for weekly evaluation and management.  Per protocol and as documented on the flow sheet, the patient was questioned as to subjective redness, pruritus, pain, drainage, fatigue, or any other symptoms.  Objectively, the radiation area was evaluated with regards to erythema, atrophy, scale, crusting, erosion, ulceration, edema, purpura, tenderness, warmth, drainage, and any other findings.  The plan was extensively reviewed including the dose, and dosing schedule.  The simulation and clinical setup was also reviewed as was the external and any internal shields and based on this review the appropriateness and sufficiency of treatment was determined.
Shielding Size (Optional- Include Units): 2.0 x 2.0cm
Treatment Time In Min (Optional): 0.33
Shielding Size (Optional- Include Units) Rx 2: 2.0 x 2.0 cm
Detail Level: Detailed
Treatment Margins In Cm: 0.5
Daily Fractionated Dose (Optional- Include Units): 256.36 cGy
Daily Fractionated Dose (Optional- Include Units): 253.11 cGy
Custom Shielding Afterword Text Will Not Be Included With Simple Simulations (X X Y Cm............): port to correlate with the lesion size, including treatment margin. The custom lead shield is adequate to accommodate the appropriate applicator and provide adequate shielding around the treatment site. Additional shielding (as noted below) is used to protect sensitive, normal tissues.
Functional Status: 1 (ambulatory, light activity)
Assessment: Appropriate reaction
Simple Simulation Preamble Text Will Be Included With Simple Simulations (.......... Indications): Simple simulation was performed today for the following reasons:
Cumulative Dose In Cgy (Optional): 5127.2
Prescription Used: 2
Day Of The Week Treatment Administered: Wednesday
Treatment Device Design After Initial Simulation Justification (Will Render If Bill For Treatment Devices = Yes): The patient is status post radiation simulation and is evaluated as to the use of additional devices for shielding and placement for radiation therapy.
Comments: On Target, RTOG 1
Field Size (Applicator): 2.0 cm
Dimensions-X Axis In Cm: 0.7
Dose Per Fractionation In Cgy (Optional): 256.36
Treatment Margins In Cm: 0.8
Cumulative Dose In Cgy (Optional): 5062.2

## 2018-08-22 ENCOUNTER — APPOINTMENT (OUTPATIENT)
Age: 68
Setting detail: DERMATOLOGY
End: 2018-08-22

## 2018-08-22 DIAGNOSIS — L63.8 OTHER ALOPECIA AREATA: ICD-10-CM

## 2018-08-22 DIAGNOSIS — L29.8 OTHER PRURITUS: ICD-10-CM

## 2018-08-22 PROCEDURE — OTHER FOLLOW UP FOR NEXT VISIT: OTHER

## 2018-08-22 PROCEDURE — OTHER COUNSELING: OTHER

## 2018-08-22 PROCEDURE — 99212 OFFICE O/P EST SF 10 MIN: CPT | Mod: 25

## 2018-08-22 PROCEDURE — OTHER INTRALESIONAL KENALOG: OTHER

## 2018-08-22 PROCEDURE — 11901 INJECT SKIN LESIONS >7: CPT

## 2018-08-22 ASSESSMENT — LOCATION DETAILED DESCRIPTION DERM
LOCATION DETAILED: LEFT OCCIPITAL SCALP
LOCATION DETAILED: POSTERIOR MID-PARIETAL SCALP
LOCATION DETAILED: LEFT SUPERIOR OCCIPITAL SCALP
LOCATION DETAILED: MID-OCCIPITAL SCALP

## 2018-08-22 ASSESSMENT — LOCATION ZONE DERM: LOCATION ZONE: SCALP

## 2018-08-22 ASSESSMENT — LOCATION SIMPLE DESCRIPTION DERM: LOCATION SIMPLE: POSTERIOR SCALP

## 2018-08-22 NOTE — PROCEDURE: FOLLOW UP FOR NEXT VISIT
Instructions (Optional): Reevaluate treatment regime
Scheduled For Follow Up In (Optional): 1 month
Instructions (Optional): Reevaluate treatment regime/ injection #4
Detail Level: Simple
Scheduled For Follow Up In (Optional): 4 weeks

## 2018-08-22 NOTE — PROCEDURE: INTRALESIONAL KENALOG
Total Volume (Ccs): .9
X Size Of Lesion In Cm (Optional): 0
Detail Level: Detailed
Expiration Date For Kenalog (Optional): 9-25-18
Medical Necessity Clause: This procedure was medically necessary because the lesions that were treated were:
Administered By (Optional): Daniel
Kenalog Preparation: Kenalog
Concentration Of Kenalog Solution Injected (Mg/Ml): 4.0
Consent: The risks of atrophy were reviewed with the patient.
Include Z78.9 (Other Specified Conditions Influencing Health Status) As An Associated Diagnosis?: No
Ndc# For Kenalog Only: [id number]

## 2018-08-29 ENCOUNTER — APPOINTMENT (OUTPATIENT)
Age: 68
Setting detail: DERMATOLOGY
End: 2018-08-29

## 2018-08-29 PROBLEM — L57.0 ACTINIC KERATOSIS: Status: ACTIVE | Noted: 2018-08-29

## 2018-08-29 PROBLEM — D04.39 CARCINOMA IN SITU OF SKIN OF OTHER PARTS OF FACE: Status: ACTIVE | Noted: 2018-08-29

## 2018-08-29 PROBLEM — C44.329 SQUAMOUS CELL CARCINOMA OF SKIN OF OTHER PARTS OF FACE: Status: ACTIVE | Noted: 2018-08-29

## 2018-08-29 PROBLEM — L85.3 XEROSIS CUTIS: Status: ACTIVE | Noted: 2018-08-29

## 2018-08-29 PROCEDURE — G6001 ECHO GUIDANCE RADIOTHERAPY: HCPCS

## 2018-08-29 PROCEDURE — OTHER TREATMENT REGIMEN: OTHER

## 2018-08-29 PROCEDURE — OTHER SUPERFICIAL RADIATION TREATMENT: OTHER

## 2018-08-29 PROCEDURE — OTHER FOLLOW UP FOR NEXT VISIT: OTHER

## 2018-08-29 PROCEDURE — 77427 RADIATION TX MANAGEMENT X5: CPT

## 2018-08-29 NOTE — PROCEDURE: TREATMENT REGIMEN
Plan: Per the request of Dr. Sanchez, patient was seen today for Superficial Radiation Therapy management.  A high frequency ultrasound image was acquired prior to treatment today for three dimensional evaluation of tumor volume and response to treatment, in addition, geometric accuracy of field placement (CPT®  ). Physician evaluation of the ultrasound tumor depth is ongoing through treatment, and is deemed medically necessary ensuring efficacy of treatment.\\n\\n2 weeks after finishing SRT, evaluation and management of SRT based on prescription and cumulative dose for reaction and response to radiation reveals adequate healing and response with pleasing aesthetics results.  No evidence of cancerous lesion on or around treatment site visible on ultrasound or dermoscopy. RTOG = 0\\n\\n\\nRt Central Malar Cheek\\nUS Depth 0.0mm, Repop +++\\n\\nLt Superior Central Malar Cheek\\nUS depth is 0.0mm, Repop +++
Detail Level: Zone

## 2018-08-29 NOTE — PROCEDURE: SUPERFICIAL RADIATION TREATMENT
Simple Simulation Afterword Text Will Be Included With Simple Simulations (Indications............): The patient had a complete consultation regarding all applicable modalities for the treatment of their skin cancer and based on a variety of factors including the type of tumor, size, and location, the relevant medical history as well as local tissue factors, the functional status of the individual, the ability to perform necessary postoperative wound instructions and the need for simultaneous treatments as well as overall wound healing status, it was determined that the patient would begin radiation therapy treatment for skin cancer.  A full simulation and treatment device design was performed including the determination and formulation of appropriate simple and complex devices including lead shield of 0.762 mm thickness to form molded customized shielding to specifically correlate with the lesion size including treatment margin.  The custom lead shield is adequate to accommodate the appropriate applicator and provide adequate shielding around the treatment site.  The specific field applicator, shields, and devices both simple and complex as well as the specific patient setup is outlined below.  The patient was given a full consent for superficial radiation to both verbally and in writing and the full determination of patient's eligibility for treatment and selection is outlined on the patient eligibility and treatment selection form.  The specific superficial radiotherapy prescription was determined and was documented on the superficial radiotherapy prescription form.  A treatment calculation was also performed and documented on the treatment calculation form.  Based on the prescription, the patient was scheduled for a series of fractional treatments.
Render Additional Prescriptions In Note?: No
Dose / Tx In Cgy (Optional): 253.11
Detail Level: Detailed
Total Dose (Optional-Please Include Units): 5127.2cGy
Dimensions-Y Axis In Cm: 1
Fractions / Week Rx 2: 3
Day Of The Week Treatment Administered: Monday
Total Number Of Fractions Rx 2: 15
Treatment Device Design After Initial Simulation Justification (Will Render If Bill For Treatment Devices = Yes): The patient is status post radiation simulation and is evaluated as to the use of additional devices for shielding and placement for radiation therapy.
Treatment Time / Fractionation (Optional- Include Units) Rx 2: min
Energy (Optional-Please Include Units): 50kV
Shielding Size (Optional- Include Units): 2.0 x 2.0cm
Daily Fractionated Dose (Optional- Include Units): 253.11 cGy
Shielding Size (Optional- Include Units) Rx 2: 2.0 x 2.0 cm
Field Size (Applicator): 2.0 cm
Fractions / Week Rx 4: 5
Simple Simulation Preamble Text Will Be Included With Simple Simulations (.......... Indications): Simple simulation was performed today for the following reasons:
Functional Status: 1 (ambulatory, light activity)
Fractionation Number: 20
Treatment Time / Fractionation (Optional- Include Units): 0.33 min
Treatment Time In Min (Optional): 0.34
Port Dimensions-X Axis In Cm: 1.5
Custom Shielding Afterword Text Will Not Be Included With Simple Simulations (X X Y Cm............): port to correlate with the lesion size, including treatment margin. The custom lead shield is adequate to accommodate the appropriate applicator and provide adequate shielding around the treatment site. Additional shielding (as noted below) is used to protect sensitive, normal tissues.
Dimensions-Y Axis In Cm: 0.7
Comments: CHATO
Cumulative Dose In Cgy (Optional): 5062.2
Total Dose (Optional-Please Include Units): 5062.2 cGy
Cumulative Dose In Cgy (Optional): 5127.2
Treatment Time In Min (Optional): 0.33
Day Of The Week Treatment Administered: Wednesday
Custom Shielding Preamble Text Will Not Be Included With Simple Simulations (.......... X X Y Cm): A lead shield of 0.762 mm thickness is utilized to form a molded, custom shield with a
Assessment: Appropriate reaction
Field Number: 2
Dose Per Fractionation In Cgy (Optional): 256.36
Field Size (Applicator): 2.5 cm
Total Dose (Optional-Please Include Units) Rx 2: cGy
Shielding Size (Optional- Include Units): 1.5 x 1.5cm
Time Dose Fractionation (Optional- Include Units If Applicable): 86
Pathology Override (Pathology Will Render As Diagnosis Name If Left Blank): SCC, superficial
Intro Statement (Will Not Render If Left Blank): The patient is undergoing superficial radiation therapy for skin cancer and presents for weekly evaluation and management.  Per protocol and as documented on the flow sheet, the patient was questioned as to subjective redness, pruritus, pain, drainage, fatigue, or any other symptoms.  Objectively, the radiation area was evaluated with regards to erythema, atrophy, scale, crusting, erosion, ulceration, edema, purpura, tenderness, warmth, drainage, and any other findings.  The plan was extensively reviewed including the dose, and dosing schedule.  The simulation and clinical setup was also reviewed as was the external and any internal shields and based on this review the appropriateness and sufficiency of treatment was determined.
Daily Fractionated Dose (Optional- Include Units): 256.36 cGy
Treatment Time / Fractionation (Optional- Include Units): 0.34 min
Bill And Render Text From Evaluation And Management Tab (Will Bill [id number]): Yes
Patient Positioning: Supine
Time Dose Fractionation (Optional- Include Units If Applicable): 88
Treatment Margins In Cm: 0.5
Treatment Margins In Cm: 0.8

## 2018-09-26 ENCOUNTER — APPOINTMENT (OUTPATIENT)
Age: 68
Setting detail: DERMATOLOGY
End: 2018-09-26

## 2018-09-26 DIAGNOSIS — L29.8 OTHER PRURITUS: ICD-10-CM

## 2018-09-26 DIAGNOSIS — L63.8 OTHER ALOPECIA AREATA: ICD-10-CM

## 2018-09-26 PROBLEM — M12.9 ARTHROPATHY, UNSPECIFIED: Status: ACTIVE | Noted: 2018-09-26

## 2018-09-26 PROCEDURE — OTHER INTRALESIONAL KENALOG: OTHER

## 2018-09-26 PROCEDURE — 99213 OFFICE O/P EST LOW 20 MIN: CPT | Mod: 25

## 2018-09-26 PROCEDURE — OTHER COUNSELING: OTHER

## 2018-09-26 PROCEDURE — OTHER FOLLOW UP FOR NEXT VISIT: OTHER

## 2018-09-26 PROCEDURE — OTHER TREATMENT REGIMEN: OTHER

## 2018-09-26 PROCEDURE — 11901 INJECT SKIN LESIONS >7: CPT

## 2018-09-26 ASSESSMENT — LOCATION ZONE DERM: LOCATION ZONE: SCALP

## 2018-09-26 ASSESSMENT — LOCATION SIMPLE DESCRIPTION DERM
LOCATION SIMPLE: POSTERIOR SCALP
LOCATION SIMPLE: SCALP

## 2018-09-26 ASSESSMENT — LOCATION DETAILED DESCRIPTION DERM
LOCATION DETAILED: RIGHT INFERIOR FRONTAL SCALP
LOCATION DETAILED: RIGHT CENTRAL PARIETAL SCALP
LOCATION DETAILED: LEFT CENTRAL FRONTAL SCALP
LOCATION DETAILED: MID-OCCIPITAL SCALP
LOCATION DETAILED: LEFT OCCIPITAL SCALP
LOCATION DETAILED: RIGHT CENTRAL FRONTAL SCALP
LOCATION DETAILED: LEFT CENTRAL PARIETAL SCALP
LOCATION DETAILED: POSTERIOR MID-PARIETAL SCALP
LOCATION DETAILED: LEFT SUPERIOR OCCIPITAL SCALP

## 2018-09-26 NOTE — PROCEDURE: INTRALESIONAL KENALOG
X Size Of Lesion In Cm (Optional): 0
Include Z78.9 (Other Specified Conditions Influencing Health Status) As An Associated Diagnosis?: No
Medical Necessity Clause: This procedure was medically necessary because the lesions that were treated were:
Detail Level: Detailed
Administered By (Optional): Daniel
Kenalog Preparation: Kenalog
Expiration Date For Kenalog (Optional): 9-27-18
Concentration Of Kenalog Solution Injected (Mg/Ml): 4.0
Consent: The risks of atrophy were reviewed with the patient.
Total Volume (Ccs): 1
Lot # For Kenalog (Optional): [id number]
Ndc# For Kenalog Only: [id number]

## 2018-10-17 ENCOUNTER — APPOINTMENT (OUTPATIENT)
Age: 68
Setting detail: DERMATOLOGY
End: 2018-10-17

## 2018-10-17 DIAGNOSIS — L63.8 OTHER ALOPECIA AREATA: ICD-10-CM

## 2018-10-17 DIAGNOSIS — L29.8 OTHER PRURITUS: ICD-10-CM

## 2018-10-17 PROCEDURE — OTHER FOLLOW UP FOR NEXT VISIT: OTHER

## 2018-10-17 PROCEDURE — OTHER INTRALESIONAL KENALOG: OTHER

## 2018-10-17 PROCEDURE — 99212 OFFICE O/P EST SF 10 MIN: CPT | Mod: 25

## 2018-10-17 PROCEDURE — OTHER COUNSELING: OTHER

## 2018-10-17 PROCEDURE — 11901 INJECT SKIN LESIONS >7: CPT

## 2018-10-17 PROCEDURE — OTHER TREATMENT REGIMEN: OTHER

## 2018-10-17 ASSESSMENT — LOCATION SIMPLE DESCRIPTION DERM
LOCATION SIMPLE: SCALP
LOCATION SIMPLE: LEFT OCCIPITAL SCALP
LOCATION SIMPLE: POSTERIOR SCALP

## 2018-10-17 ASSESSMENT — LOCATION DETAILED DESCRIPTION DERM
LOCATION DETAILED: LEFT SUPERIOR PARIETAL SCALP
LOCATION DETAILED: RIGHT CENTRAL PARIETAL SCALP
LOCATION DETAILED: LEFT CENTRAL FRONTAL SCALP
LOCATION DETAILED: RIGHT CENTRAL FRONTAL SCALP
LOCATION DETAILED: LEFT SUPERIOR OCCIPITAL SCALP
LOCATION DETAILED: RIGHT INFERIOR FRONTAL SCALP
LOCATION DETAILED: RIGHT INFERIOR PARIETAL SCALP
LOCATION DETAILED: MID-OCCIPITAL SCALP
LOCATION DETAILED: POSTERIOR MID-PARIETAL SCALP
LOCATION DETAILED: LEFT CENTRAL PARIETAL SCALP

## 2018-10-17 ASSESSMENT — LOCATION ZONE DERM: LOCATION ZONE: SCALP

## 2018-10-17 NOTE — PROCEDURE: FOLLOW UP FOR NEXT VISIT
Detail Level: Simple
Scheduled For Follow Up In (Optional): 3 weeks
Instructions (Optional): Scalp injections

## 2018-10-17 NOTE — PROCEDURE: INTRALESIONAL KENALOG
Medical Necessity Clause: This procedure was medically necessary because the lesions that were treated were:
Include Z78.9 (Other Specified Conditions Influencing Health Status) As An Associated Diagnosis?: No
Total Volume (Ccs): 3 ml
Concentration Of Kenalog Solution Injected (Mg/Ml): 4.0
Expiration Date For Kenalog (Optional): 1-3-19
Detail Level: Detailed
Kenalog Preparation: Kenalog with normal saline
Size Of Lesion (Optional): 6
Lot # For Kenalog (Optional): [id number]
Administered By (Optional): dr. Sanchez
Ndc# For Kenalog Only: 0720-0718-14
Treatment Number (Optional): 7
Consent: The risks of atrophy were reviewed with the patient.

## 2018-11-07 ENCOUNTER — APPOINTMENT (OUTPATIENT)
Age: 68
Setting detail: DERMATOLOGY
End: 2018-11-12

## 2018-11-07 DIAGNOSIS — L63.8 OTHER ALOPECIA AREATA: ICD-10-CM

## 2018-11-07 DIAGNOSIS — L29.8 OTHER PRURITUS: ICD-10-CM

## 2018-11-07 PROCEDURE — 99213 OFFICE O/P EST LOW 20 MIN: CPT | Mod: 25

## 2018-11-07 PROCEDURE — OTHER FOLLOW UP FOR NEXT VISIT: OTHER

## 2018-11-07 PROCEDURE — 11900 INJECT SKIN LESIONS </W 7: CPT

## 2018-11-07 PROCEDURE — OTHER COUNSELING: OTHER

## 2018-11-07 PROCEDURE — OTHER INTRALESIONAL KENALOG: OTHER

## 2018-11-07 ASSESSMENT — LOCATION DETAILED DESCRIPTION DERM
LOCATION DETAILED: RIGHT INFERIOR FRONTAL SCALP
LOCATION DETAILED: POSTERIOR MID-PARIETAL SCALP
LOCATION DETAILED: LEFT SUPERIOR PARIETAL SCALP
LOCATION DETAILED: LEFT CENTRAL PARIETAL SCALP
LOCATION DETAILED: RIGHT INFERIOR PARIETAL SCALP

## 2018-11-07 ASSESSMENT — LOCATION SIMPLE DESCRIPTION DERM
LOCATION SIMPLE: SCALP
LOCATION SIMPLE: POSTERIOR SCALP

## 2018-11-07 ASSESSMENT — LOCATION ZONE DERM: LOCATION ZONE: SCALP

## 2018-11-07 NOTE — PROCEDURE: FOLLOW UP FOR NEXT VISIT
Detail Level: Simple
Instructions (Optional): Scalp injections
Scheduled For Follow Up In (Optional): 3 weeks

## 2018-11-28 ENCOUNTER — APPOINTMENT (OUTPATIENT)
Age: 68
Setting detail: DERMATOLOGY
End: 2018-11-29

## 2018-11-28 DIAGNOSIS — L63.8 OTHER ALOPECIA AREATA: ICD-10-CM

## 2018-11-28 DIAGNOSIS — L29.8 OTHER PRURITUS: ICD-10-CM

## 2018-11-28 PROBLEM — L55.1 SUNBURN OF SECOND DEGREE: Status: ACTIVE | Noted: 2018-11-28

## 2018-11-28 PROCEDURE — 11900 INJECT SKIN LESIONS </W 7: CPT

## 2018-11-28 PROCEDURE — OTHER INTRALESIONAL KENALOG: OTHER

## 2018-11-28 PROCEDURE — 99213 OFFICE O/P EST LOW 20 MIN: CPT | Mod: 25

## 2018-11-28 PROCEDURE — OTHER FOLLOW UP FOR NEXT VISIT: OTHER

## 2018-11-28 PROCEDURE — OTHER COUNSELING: OTHER

## 2018-11-28 ASSESSMENT — LOCATION DETAILED DESCRIPTION DERM
LOCATION DETAILED: RIGHT INFERIOR PARIETAL SCALP
LOCATION DETAILED: RIGHT INFERIOR FRONTAL SCALP
LOCATION DETAILED: LEFT CENTRAL PARIETAL SCALP
LOCATION DETAILED: POSTERIOR MID-PARIETAL SCALP
LOCATION DETAILED: LEFT SUPERIOR PARIETAL SCALP

## 2018-11-28 ASSESSMENT — LOCATION ZONE DERM: LOCATION ZONE: SCALP

## 2018-11-28 ASSESSMENT — LOCATION SIMPLE DESCRIPTION DERM
LOCATION SIMPLE: POSTERIOR SCALP
LOCATION SIMPLE: SCALP

## 2018-11-28 NOTE — PROCEDURE: FOLLOW UP FOR NEXT VISIT
Scheduled For Follow Up In (Optional): 3 weeks
Detail Level: Simple
Instructions (Optional): Scalp injections

## 2018-11-28 NOTE — PROCEDURE: INTRALESIONAL KENALOG
Ndc# For Kenalog Only: 6557-4953-84
Lot # For Kenalog (Optional): [id number]
Concentration Of Kenalog Solution Injected (Mg/Ml): 4.0
Administered By (Optional): Daniel
Detail Level: Detailed
Consent: The risks of atrophy were reviewed with the patient.
X Size Of Lesion In Cm (Optional): 0
Kenalog Preparation: Kenalog
Include Z78.9 (Other Specified Conditions Influencing Health Status) As An Associated Diagnosis?: No
Medical Necessity Clause: This procedure was medically necessary because the lesions that were treated were:
Total Volume (Ccs): 3
Expiration Date For Kenalog (Optional): 1-2-19

## 2019-02-20 ENCOUNTER — APPOINTMENT (OUTPATIENT)
Age: 69
Setting detail: DERMATOLOGY
End: 2019-02-21

## 2019-02-20 DIAGNOSIS — Z85.828 PERSONAL HISTORY OF OTHER MALIGNANT NEOPLASM OF SKIN: ICD-10-CM

## 2019-02-20 DIAGNOSIS — D485 NEOPLASM OF UNCERTAIN BEHAVIOR OF SKIN: ICD-10-CM

## 2019-02-20 DIAGNOSIS — L57.8 OTHER SKIN CHANGES DUE TO CHRONIC EXPOSURE TO NONIONIZING RADIATION: ICD-10-CM

## 2019-02-20 DIAGNOSIS — L63.8 OTHER ALOPECIA AREATA: ICD-10-CM

## 2019-02-20 PROBLEM — J30.1 ALLERGIC RHINITIS DUE TO POLLEN: Status: ACTIVE | Noted: 2019-02-20

## 2019-02-20 PROBLEM — D48.5 NEOPLASM OF UNCERTAIN BEHAVIOR OF SKIN: Status: ACTIVE | Noted: 2019-02-20

## 2019-02-20 PROCEDURE — OTHER COUNSELING: OTHER

## 2019-02-20 PROCEDURE — OTHER FOLLOW UP FOR NEXT VISIT: OTHER

## 2019-02-20 PROCEDURE — 99213 OFFICE O/P EST LOW 20 MIN: CPT

## 2019-02-20 PROCEDURE — OTHER DEFER: OTHER

## 2019-02-20 ASSESSMENT — LOCATION ZONE DERM
LOCATION ZONE: HAND
LOCATION ZONE: ARM
LOCATION ZONE: SCALP
LOCATION ZONE: FACE
LOCATION ZONE: FACE

## 2019-02-20 ASSESSMENT — LOCATION DETAILED DESCRIPTION DERM
LOCATION DETAILED: RIGHT CENTRAL MALAR CHEEK
LOCATION DETAILED: RIGHT CENTRAL PARIETAL SCALP
LOCATION DETAILED: LEFT INFERIOR MEDIAL FOREHEAD
LOCATION DETAILED: LEFT CENTRAL MALAR CHEEK
LOCATION DETAILED: RIGHT RADIAL DORSAL HAND
LOCATION DETAILED: LEFT PROXIMAL DORSAL FOREARM
LOCATION DETAILED: LEFT CENTRAL PARIETAL SCALP
LOCATION DETAILED: RIGHT LATERAL FOREHEAD

## 2019-02-20 ASSESSMENT — LOCATION SIMPLE DESCRIPTION DERM
LOCATION SIMPLE: RIGHT HAND
LOCATION SIMPLE: RIGHT FOREHEAD
LOCATION SIMPLE: LEFT FOREARM
LOCATION SIMPLE: LEFT CHEEK
LOCATION SIMPLE: RIGHT CHEEK
LOCATION SIMPLE: SCALP
LOCATION SIMPLE: LEFT FOREHEAD

## 2019-03-06 ENCOUNTER — APPOINTMENT (OUTPATIENT)
Age: 69
Setting detail: DERMATOLOGY
End: 2019-03-07

## 2019-03-06 DIAGNOSIS — D485 NEOPLASM OF UNCERTAIN BEHAVIOR OF SKIN: ICD-10-CM

## 2019-03-06 PROBLEM — D48.5 NEOPLASM OF UNCERTAIN BEHAVIOR OF SKIN: Status: ACTIVE | Noted: 2019-03-06

## 2019-03-06 PROCEDURE — OTHER BIOPSY BY SHAVE METHOD: OTHER

## 2019-03-06 PROCEDURE — 11103 TANGNTL BX SKIN EA SEP/ADDL: CPT

## 2019-03-06 PROCEDURE — 11102 TANGNTL BX SKIN SINGLE LES: CPT

## 2019-03-06 ASSESSMENT — LOCATION ZONE DERM
LOCATION ZONE: HAND
LOCATION ZONE: FACE

## 2019-03-06 ASSESSMENT — LOCATION SIMPLE DESCRIPTION DERM
LOCATION SIMPLE: RIGHT HAND
LOCATION SIMPLE: RIGHT FOREHEAD

## 2019-03-06 ASSESSMENT — LOCATION DETAILED DESCRIPTION DERM
LOCATION DETAILED: RIGHT SUPERIOR FOREHEAD
LOCATION DETAILED: RIGHT RADIAL DORSAL HAND

## 2019-03-06 NOTE — PROCEDURE: BIOPSY BY SHAVE METHOD
Curettage Text: The wound bed was treated with curettage after the biopsy was performed.
Bill 67113 For Specimen Handling/Conveyance To Laboratory?: no
Hemostasis: Drysol
Type Of Destruction Used: Curettage
Dressing: bandage
Wound Care: Bacitracin
Was A Bandage Applied: Yes
Biopsy Method: Acu-Razor
Electrodesiccation And Curettage Text: The wound bed was treated with electrodesiccation and curettage after the biopsy was performed.
X Size Of Lesion In Cm: 0
Notification Instructions: Patient will be notified of biopsy results. However, patient instructed to call the office if not contacted within 2 weeks.
Post-Care Instructions: I reviewed with the patient in detail post-care instructions. Patient is to keep the biopsy site dry overnight, and then apply bacitracin twice daily until healed. Patient may apply hydrogen peroxide soaks to remove any crusting.
Detail Level: Detailed
Anesthesia Volume In Cc: 0.5
Billing Type: Third-Party Bill
Electrodesiccation Text: The wound bed was treated with electrodesiccation after the biopsy was performed.
Consent: Written consent was obtained and risks were reviewed including but not limited to scarring, infection, bleeding, scabbing, incomplete removal, nerve damage and allergy to anesthesia. Intent of procedure was to obtain tissue sample for histopathic examination. Clinical evaluation reveals changes suspicious for rule out provided in path req. A skin biopsy is considered a necessary and appropriate to clarify the diagnosis.
Biopsy Type: H and E
Depth Of Biopsy: dermis
Silver Nitrate Text: The wound bed was treated with silver nitrate after the biopsy was performed.
Cryotherapy Text: The wound bed was treated with cryotherapy after the biopsy was performed.
Size Of Lesion In Cm: 0.4

## 2019-04-07 NOTE — PROCEDURE: SUPERFICIAL RADIATION TREATMENT
Total Dose (Optional-Please Include Units): 5062.2 cGy
Custom Shielding Afterword Text Will Not Be Included With Simple Simulations (X X Y Cm............): port to correlate with the lesion size, including treatment margin. The custom lead shield is adequate to accommodate the appropriate applicator and provide adequate shielding around the treatment site. Additional shielding (as noted below) is used to protect sensitive, normal tissues.
hospitalist
Fractionation Number (Evaluation): 5
Assessment: Appropriate reaction
Number Of Days Off Treatment: 1
Field Size (Applicator): 2.5 cm
Cumulative Dose In Cgy (Optional): 769.08
Daily Fractionated Dose (Optional- Include Units) Rx 2: cGy
Functional Status: 1 (ambulatory, light activity)
Pathology Override (Pathology Will Render As Diagnosis Name If Left Blank): SCC, superficial
Render Prescriptions In Note?: No
Cumulative Dose In Cgy (Optional): 759.33
Fractions / Week Rx 2: 3
Field Size (Applicator): 2.0 cm
Custom Shielding Preamble Text Will Not Be Included With Simple Simulations (.......... X X Y Cm): A lead shield of 0.762 mm thickness is utilized to form a molded, custom shield with a
Dose Per Fractionation In Cgy (Optional): 253.11
Bill For Radiation Treatment: Yes
Treatment Time / Fractionation (Optional- Include Units) Rx 2: min
Total Number Of Fractions Rx 2: 15
Dimensions-Y Axis In Cm: 0.7
Simple Simulation Preamble Text Will Be Included With Simple Simulations (.......... Indications): Simple simulation was performed today for the following reasons:
Time Dose Fractionation (Optional- Include Units If Applicable): 88
Energy (Include Units): 50kV
Intro Statement (Will Not Render If Left Blank): The patient is undergoing superficial radiation therapy for skin cancer and presents for weekly evaluation and management.  Per protocol and as documented on the flow sheet, the patient was questioned as to subjective redness, pruritus, pain, drainage, fatigue, or any other symptoms.  Objectively, the radiation area was evaluated with regards to erythema, atrophy, scale, crusting, erosion, ulceration, edema, purpura, tenderness, warmth, drainage, and any other findings.  The plan was extensively reviewed including the dose, and dosing schedule.  The simulation and clinical setup was also reviewed as was the external and any internal shields and based on this review the appropriateness and sufficiency of treatment was determined.
Detail Level: Detailed
Computed Treatment Time In Min (Will Render The Same As Calculated Treatment Time If Left Blank): 0.33
Prescription Used: 2
Total Number Of Fractions: 20
Dose / Tx In Cgy (Optional): 256.36
Daily Fractionated Dose (Optional- Include Units): 253.11 cGy
Shielding Size (Optional- Include Units): 1.5 x 1.5cm
Shielding Size (Optional- Include Units) Rx 2: 2.0 x 2.0 cm
Initial Radiation Treatment Planning (Will Render If Bill Simulation = Yes): The patient had a complete consultation regarding all applicable modalities for the treatment of their skin cancer and based on a variety of factors including the type of tumor, size, and location, the relevant medical history as well as local tissue factors, the functional status of the individual, the ability to perform necessary postoperative wound instructions and the need for simultaneous treatments as well as overall wound healing status, it was determined that the patient would begin radiation therapy treatment for skin cancer.  A full simulation and treatment device design was performed including the determination and formulation of appropriate simple and complex devices including lead shield of 0.762 mm thickness to form molded customized shielding to specifically correlate with the lesion size including treatment margin.  The custom lead shield is adequate to accommodate the appropriate applicator and provide adequate shielding around the treatment site.  The specific field applicator, shields, and devices both simple and complex as well as the specific patient setup is outlined below.  The patient was given a full consent for superficial radiation to both verbally and in writing and the full determination of patient's eligibility for treatment and selection is outlined on the patient eligibility and treatment selection form.  The specific superficial radiotherapy prescription was determined and was documented on the superficial radiotherapy prescription form.  A treatment calculation was also performed and documented on the treatment calculation form.  Based on the prescription, the patient was scheduled for a series of fractional treatments.
Day Of The Week Treatment Administered: Thursday
Daily Fractionated Dose (Optional- Include Units): 256.36 cGy
Treatment Device Design After Initial Simulation Justification (Will Render If Bill For Treatment Devices = Yes): The patient is status post radiation simulation and is evaluated as to the use of additional devices for shielding and placement for radiation therapy.
Treatment Margins In Cm: 0.8
Treatment Time / Fractionation (Optional- Include Units): 0.33 min
Port Dimensions-X Axis In Cm: 1.5
Shielding Size (Optional- Include Units): 2.0 x 2.0cm
Computed Treatment Time In Min (Will Render The Same As Calculated Treatment Time If Left Blank): 0.34
Treatment Margins In Cm: 0.5
Total Dose (Optional-Please Include Units): 5127.2cGy
Time Dose Fractionation (Optional- Include Units If Applicable): 86
Treatment Time / Fractionation (Optional- Include Units): 0.34 min
Patient Positioning: Supine

## 2019-04-17 ENCOUNTER — APPOINTMENT (OUTPATIENT)
Age: 69
Setting detail: DERMATOLOGY
End: 2019-04-18

## 2019-04-17 DIAGNOSIS — Z85.828 PERSONAL HISTORY OF OTHER MALIGNANT NEOPLASM OF SKIN: ICD-10-CM

## 2019-04-17 DIAGNOSIS — L57.8 OTHER SKIN CHANGES DUE TO CHRONIC EXPOSURE TO NONIONIZING RADIATION: ICD-10-CM

## 2019-04-17 DIAGNOSIS — L57.0 ACTINIC KERATOSIS: ICD-10-CM

## 2019-04-17 PROCEDURE — OTHER COUNSELING: OTHER

## 2019-04-17 PROCEDURE — OTHER MIPS QUALITY: OTHER

## 2019-04-17 PROCEDURE — 99213 OFFICE O/P EST LOW 20 MIN: CPT | Mod: 25

## 2019-04-17 PROCEDURE — OTHER LIQUID NITROGEN: OTHER

## 2019-04-17 PROCEDURE — 17000 DESTRUCT PREMALG LESION: CPT

## 2019-04-17 ASSESSMENT — LOCATION DETAILED DESCRIPTION DERM
LOCATION DETAILED: RIGHT SUPERIOR FOREHEAD
LOCATION DETAILED: LEFT INFERIOR MEDIAL FOREHEAD

## 2019-04-17 ASSESSMENT — LOCATION SIMPLE DESCRIPTION DERM
LOCATION SIMPLE: LEFT FOREHEAD
LOCATION SIMPLE: RIGHT FOREHEAD

## 2019-04-17 ASSESSMENT — LOCATION ZONE DERM: LOCATION ZONE: FACE

## 2019-04-17 NOTE — PROCEDURE: LIQUID NITROGEN
Detail Level: Simple
Post-Care Instructions: I reviewed with the patient in detail post-care instructions. Patient is to wear sunprotection, and avoid picking at any of the treated lesions. Pt may apply Vaseline to crusted or scabbing areas.
Render Post-Care Instructions In Note?: no
Consent: The patient's consent was obtained including but not limited to risks of crusting, scabbing, blistering, scarring, darker or lighter pigmentary change, recurrence, incomplete removal and infection.
Duration Of Freeze Thaw-Cycle (Seconds): 3
Number Of Freeze-Thaw Cycles: 3 freeze-thaw cycles

## 2019-12-04 ENCOUNTER — APPOINTMENT (OUTPATIENT)
Age: 69
Setting detail: DERMATOLOGY
End: 2019-12-09

## 2019-12-04 DIAGNOSIS — L57.8 OTHER SKIN CHANGES DUE TO CHRONIC EXPOSURE TO NONIONIZING RADIATION: ICD-10-CM

## 2019-12-04 DIAGNOSIS — Z85.828 PERSONAL HISTORY OF OTHER MALIGNANT NEOPLASM OF SKIN: ICD-10-CM

## 2019-12-04 PROCEDURE — OTHER FOLLOW UP FOR NEXT VISIT: OTHER

## 2019-12-04 PROCEDURE — 99214 OFFICE O/P EST MOD 30 MIN: CPT

## 2019-12-04 PROCEDURE — OTHER COUNSELING: OTHER

## 2019-12-04 ASSESSMENT — LOCATION SIMPLE DESCRIPTION DERM
LOCATION SIMPLE: LEFT FOREARM
LOCATION SIMPLE: LEFT CHEEK
LOCATION SIMPLE: RIGHT CHEEK
LOCATION SIMPLE: LEFT FOREHEAD

## 2019-12-04 ASSESSMENT — LOCATION DETAILED DESCRIPTION DERM
LOCATION DETAILED: LEFT CENTRAL MALAR CHEEK
LOCATION DETAILED: RIGHT CENTRAL MALAR CHEEK
LOCATION DETAILED: LEFT INFERIOR MEDIAL FOREHEAD
LOCATION DETAILED: LEFT PROXIMAL DORSAL FOREARM

## 2019-12-04 ASSESSMENT — LOCATION ZONE DERM
LOCATION ZONE: FACE
LOCATION ZONE: ARM

## 2022-11-02 NOTE — PROCEDURE: FOLLOW UP FOR NEXT VISIT
Instructions (Optional): Injection #6
Scheduled For Follow Up In (Optional): 1 month
Detail Level: Simple
Instructions (Optional): Scalp injections
Scheduled For Follow Up In (Optional): 3 weeks
yes
